# Patient Record
Sex: FEMALE | Race: WHITE | NOT HISPANIC OR LATINO | Employment: OTHER | URBAN - METROPOLITAN AREA
[De-identification: names, ages, dates, MRNs, and addresses within clinical notes are randomized per-mention and may not be internally consistent; named-entity substitution may affect disease eponyms.]

---

## 2023-02-08 PROBLEM — H91.90 HEARING LOSS: Status: ACTIVE | Noted: 2023-02-08

## 2023-02-08 PROBLEM — H61.21 IMPACTED CERUMEN OF RIGHT EAR: Status: ACTIVE | Noted: 2023-02-08

## 2023-02-08 PROBLEM — L43.9 LICHEN PLANUS: Status: ACTIVE | Noted: 2023-02-08

## 2023-02-08 PROBLEM — I10 ESSENTIAL HYPERTENSION: Status: ACTIVE | Noted: 2023-02-08

## 2023-02-08 PROBLEM — E55.9 VITAMIN D DEFICIENCY: Status: ACTIVE | Noted: 2023-02-08

## 2023-02-08 PROBLEM — G62.9 NEUROPATHY: Status: ACTIVE | Noted: 2023-02-08

## 2023-02-08 PROBLEM — R53.1 WEAKNESS: Status: ACTIVE | Noted: 2023-02-08

## 2023-02-08 PROBLEM — F33.9 MAJOR DEPRESSION, RECURRENT (CMS-HCC): Status: ACTIVE | Noted: 2023-02-08

## 2023-02-08 PROBLEM — K75.81 NASH (NONALCOHOLIC STEATOHEPATITIS): Status: ACTIVE | Noted: 2023-02-08

## 2023-02-08 PROBLEM — F90.9 ATTENTION DEFICIT DISORDER WITH HYPERACTIVITY: Status: ACTIVE | Noted: 2023-02-08

## 2023-02-08 PROBLEM — M54.6 LOWER THORACIC BACK PAIN: Status: ACTIVE | Noted: 2023-02-08

## 2023-02-08 PROBLEM — H81.13 BENIGN PAROXYSMAL POSITIONAL VERTIGO DUE TO BILATERAL VESTIBULAR DISORDER: Status: ACTIVE | Noted: 2023-02-08

## 2023-02-08 PROBLEM — K14.8 TONGUE LESION: Status: ACTIVE | Noted: 2023-02-08

## 2023-02-08 PROBLEM — I82.409 DVT (DEEP VENOUS THROMBOSIS) (MULTI): Status: ACTIVE | Noted: 2023-02-08

## 2023-02-08 PROBLEM — E78.5 HYPERLIPIDEMIA: Status: ACTIVE | Noted: 2023-02-08

## 2023-02-08 PROBLEM — M81.0 POSTMENOPAUSAL BONE LOSS: Status: ACTIVE | Noted: 2023-02-08

## 2023-02-08 PROBLEM — B35.4 TINEA CORPORIS: Status: ACTIVE | Noted: 2023-02-08

## 2023-02-08 PROBLEM — R73.01 IMPAIRED FASTING GLUCOSE: Status: ACTIVE | Noted: 2023-02-08

## 2023-02-08 PROBLEM — E03.9 ACQUIRED HYPOTHYROIDISM: Status: ACTIVE | Noted: 2023-02-08

## 2023-02-08 PROBLEM — M54.50 LOW BACK PAIN: Status: ACTIVE | Noted: 2023-02-08

## 2023-02-08 PROBLEM — N95.2 POSTMENOPAUSAL ATROPHIC VAGINITIS: Status: ACTIVE | Noted: 2023-02-08

## 2023-02-08 PROBLEM — D68.62 LUPUS ANTICOAGULANT DISORDER (MULTI): Status: ACTIVE | Noted: 2023-02-08

## 2023-02-08 RX ORDER — TRIAMCINOLONE ACETONIDE 0.25 MG/G
1 CREAM TOPICAL
COMMUNITY
End: 2024-01-10 | Stop reason: WASHOUT

## 2023-02-08 RX ORDER — CLINDAMYCIN PHOSPHATE 11.9 MG/ML
1 SOLUTION TOPICAL 2 TIMES DAILY
COMMUNITY
End: 2023-03-22 | Stop reason: SDUPTHER

## 2023-02-08 RX ORDER — AMLODIPINE BESYLATE 5 MG/1
1 TABLET ORAL DAILY
COMMUNITY
End: 2023-04-13 | Stop reason: SDUPTHER

## 2023-02-08 RX ORDER — DEXTROAMPHETAMINE SACCHARATE, AMPHETAMINE ASPARTATE, DEXTROAMPHETAMINE SULFATE AND AMPHETAMINE SULFATE 2.5; 2.5; 2.5; 2.5 MG/1; MG/1; MG/1; MG/1
1 TABLET ORAL 3 TIMES DAILY
COMMUNITY
End: 2023-10-25 | Stop reason: SDUPTHER

## 2023-02-08 RX ORDER — ATORVASTATIN CALCIUM 40 MG/1
1 TABLET, FILM COATED ORAL DAILY
COMMUNITY
End: 2023-04-13 | Stop reason: SDUPTHER

## 2023-02-08 RX ORDER — METOPROLOL TARTRATE 25 MG/1
1 TABLET, FILM COATED ORAL 2 TIMES DAILY
COMMUNITY
End: 2023-04-13 | Stop reason: SDUPTHER

## 2023-02-08 RX ORDER — WARFARIN 2.5 MG/1
1 TABLET ORAL DAILY
COMMUNITY
End: 2023-04-13 | Stop reason: SDUPTHER

## 2023-02-08 RX ORDER — FLUOXETINE HYDROCHLORIDE 20 MG/1
1 CAPSULE ORAL 2 TIMES DAILY
COMMUNITY
End: 2023-12-07

## 2023-03-22 ENCOUNTER — OFFICE VISIT (OUTPATIENT)
Dept: PRIMARY CARE | Facility: CLINIC | Age: 78
End: 2023-03-22
Payer: MEDICARE

## 2023-03-22 ENCOUNTER — LAB (OUTPATIENT)
Dept: LAB | Facility: LAB | Age: 78
End: 2023-03-22
Payer: MEDICARE

## 2023-03-22 VITALS
WEIGHT: 183 LBS | SYSTOLIC BLOOD PRESSURE: 118 MMHG | DIASTOLIC BLOOD PRESSURE: 68 MMHG | HEIGHT: 64 IN | BODY MASS INDEX: 31.24 KG/M2

## 2023-03-22 DIAGNOSIS — D68.62 LUPUS ANTICOAGULANT DISORDER (MULTI): ICD-10-CM

## 2023-03-22 DIAGNOSIS — I82.519 CHRONIC DEEP VEIN THROMBOSIS (DVT) OF FEMORAL VEIN, UNSPECIFIED LATERALITY (MULTI): ICD-10-CM

## 2023-03-22 DIAGNOSIS — R73.01 IMPAIRED FASTING GLUCOSE: ICD-10-CM

## 2023-03-22 DIAGNOSIS — L71.9 ROSACEA: ICD-10-CM

## 2023-03-22 DIAGNOSIS — F33.9 RECURRENT MAJOR DEPRESSIVE DISORDER, REMISSION STATUS UNSPECIFIED (CMS-HCC): ICD-10-CM

## 2023-03-22 DIAGNOSIS — K75.81 NASH (NONALCOHOLIC STEATOHEPATITIS): ICD-10-CM

## 2023-03-22 DIAGNOSIS — E78.00 PURE HYPERCHOLESTEROLEMIA: ICD-10-CM

## 2023-03-22 DIAGNOSIS — I10 ESSENTIAL HYPERTENSION: Primary | ICD-10-CM

## 2023-03-22 DIAGNOSIS — S69.92XA HAND INJURY, LEFT, INITIAL ENCOUNTER: ICD-10-CM

## 2023-03-22 LAB
ALANINE AMINOTRANSFERASE (SGPT) (U/L) IN SER/PLAS: 18 U/L (ref 7–45)
ALBUMIN (G/DL) IN SER/PLAS: 4.4 G/DL (ref 3.4–5)
ALKALINE PHOSPHATASE (U/L) IN SER/PLAS: 110 U/L (ref 33–136)
ANION GAP IN SER/PLAS: 14 MMOL/L (ref 10–20)
ASPARTATE AMINOTRANSFERASE (SGOT) (U/L) IN SER/PLAS: 25 U/L (ref 9–39)
BASOPHILS (10*3/UL) IN BLOOD BY AUTOMATED COUNT: 0.03 X10E9/L (ref 0–0.1)
BASOPHILS/100 LEUKOCYTES IN BLOOD BY AUTOMATED COUNT: 0.3 % (ref 0–2)
BILIRUBIN TOTAL (MG/DL) IN SER/PLAS: 0.9 MG/DL (ref 0–1.2)
CALCIUM (MG/DL) IN SER/PLAS: 9.6 MG/DL (ref 8.6–10.3)
CARBON DIOXIDE, TOTAL (MMOL/L) IN SER/PLAS: 26 MMOL/L (ref 21–32)
CHLORIDE (MMOL/L) IN SER/PLAS: 103 MMOL/L (ref 98–107)
CHOLESTEROL (MG/DL) IN SER/PLAS: 184 MG/DL (ref 0–199)
CHOLESTEROL IN HDL (MG/DL) IN SER/PLAS: 52.5 MG/DL
CHOLESTEROL/HDL RATIO: 3.5
CREATINE KINASE (U/L) IN SER/PLAS: 44 U/L (ref 0–215)
CREATININE (MG/DL) IN SER/PLAS: 0.98 MG/DL (ref 0.5–1.05)
EOSINOPHILS (10*3/UL) IN BLOOD BY AUTOMATED COUNT: 0.17 X10E9/L (ref 0–0.4)
EOSINOPHILS/100 LEUKOCYTES IN BLOOD BY AUTOMATED COUNT: 1.5 % (ref 0–6)
ERYTHROCYTE DISTRIBUTION WIDTH (RATIO) BY AUTOMATED COUNT: 13.3 % (ref 11.5–14.5)
ERYTHROCYTE MEAN CORPUSCULAR HEMOGLOBIN CONCENTRATION (G/DL) BY AUTOMATED: 33 G/DL (ref 32–36)
ERYTHROCYTE MEAN CORPUSCULAR VOLUME (FL) BY AUTOMATED COUNT: 91 FL (ref 80–100)
ERYTHROCYTES (10*6/UL) IN BLOOD BY AUTOMATED COUNT: 4.96 X10E12/L (ref 4–5.2)
GFR FEMALE: 60 ML/MIN/1.73M2
GLUCOSE (MG/DL) IN SER/PLAS: 84 MG/DL (ref 74–99)
HEMATOCRIT (%) IN BLOOD BY AUTOMATED COUNT: 44.9 % (ref 36–46)
HEMOGLOBIN (G/DL) IN BLOOD: 14.8 G/DL (ref 12–16)
IMMATURE GRANULOCYTES/100 LEUKOCYTES IN BLOOD BY AUTOMATED COUNT: 0.3 % (ref 0–0.9)
LDL: 102 MG/DL (ref 0–99)
LEUKOCYTES (10*3/UL) IN BLOOD BY AUTOMATED COUNT: 11.2 X10E9/L (ref 4.4–11.3)
LYMPHOCYTES (10*3/UL) IN BLOOD BY AUTOMATED COUNT: 2.07 X10E9/L (ref 0.8–3)
LYMPHOCYTES/100 LEUKOCYTES IN BLOOD BY AUTOMATED COUNT: 18.5 % (ref 13–44)
MONOCYTES (10*3/UL) IN BLOOD BY AUTOMATED COUNT: 1.23 X10E9/L (ref 0.05–0.8)
MONOCYTES/100 LEUKOCYTES IN BLOOD BY AUTOMATED COUNT: 11 % (ref 2–10)
NEUTROPHILS (10*3/UL) IN BLOOD BY AUTOMATED COUNT: 7.64 X10E9/L (ref 1.6–5.5)
NEUTROPHILS/100 LEUKOCYTES IN BLOOD BY AUTOMATED COUNT: 68.4 % (ref 40–80)
PLATELETS (10*3/UL) IN BLOOD AUTOMATED COUNT: 347 X10E9/L (ref 150–450)
POTASSIUM (MMOL/L) IN SER/PLAS: 4.4 MMOL/L (ref 3.5–5.3)
PROTEIN TOTAL: 6.8 G/DL (ref 6.4–8.2)
SODIUM (MMOL/L) IN SER/PLAS: 139 MMOL/L (ref 136–145)
THYROTROPIN (MIU/L) IN SER/PLAS BY DETECTION LIMIT <= 0.05 MIU/L: 2.23 MIU/L (ref 0.44–3.98)
TRIGLYCERIDE (MG/DL) IN SER/PLAS: 149 MG/DL (ref 0–149)
UREA NITROGEN (MG/DL) IN SER/PLAS: 22 MG/DL (ref 6–23)
VLDL: 30 MG/DL (ref 0–40)

## 2023-03-22 PROCEDURE — 1159F MED LIST DOCD IN RCRD: CPT | Performed by: INTERNAL MEDICINE

## 2023-03-22 PROCEDURE — 36415 COLL VENOUS BLD VENIPUNCTURE: CPT

## 2023-03-22 PROCEDURE — 80053 COMPREHEN METABOLIC PANEL: CPT

## 2023-03-22 PROCEDURE — 1160F RVW MEDS BY RX/DR IN RCRD: CPT | Performed by: INTERNAL MEDICINE

## 2023-03-22 PROCEDURE — 80061 LIPID PANEL: CPT

## 2023-03-22 PROCEDURE — 82550 ASSAY OF CK (CPK): CPT

## 2023-03-22 PROCEDURE — 3078F DIAST BP <80 MM HG: CPT | Performed by: INTERNAL MEDICINE

## 2023-03-22 PROCEDURE — 84443 ASSAY THYROID STIM HORMONE: CPT

## 2023-03-22 PROCEDURE — 83036 HEMOGLOBIN GLYCOSYLATED A1C: CPT

## 2023-03-22 PROCEDURE — 85025 COMPLETE CBC W/AUTO DIFF WBC: CPT

## 2023-03-22 PROCEDURE — 3074F SYST BP LT 130 MM HG: CPT | Performed by: INTERNAL MEDICINE

## 2023-03-22 PROCEDURE — 99214 OFFICE O/P EST MOD 30 MIN: CPT | Performed by: INTERNAL MEDICINE

## 2023-03-22 PROCEDURE — 1036F TOBACCO NON-USER: CPT | Performed by: INTERNAL MEDICINE

## 2023-03-22 RX ORDER — CLINDAMYCIN PHOSPHATE 11.9 MG/ML
1 SOLUTION TOPICAL 2 TIMES DAILY
Qty: 60 ML | Refills: 0 | Status: SHIPPED | OUTPATIENT
Start: 2023-03-22 | End: 2024-01-10 | Stop reason: WASHOUT

## 2023-03-22 ASSESSMENT — PATIENT HEALTH QUESTIONNAIRE - PHQ9
2. FEELING DOWN, DEPRESSED OR HOPELESS: NOT AT ALL
SUM OF ALL RESPONSES TO PHQ9 QUESTIONS 1 AND 2: 0
1. LITTLE INTEREST OR PLEASURE IN DOING THINGS: NOT AT ALL

## 2023-03-22 NOTE — PROGRESS NOTES
Subjective   Patient ID: Diane Rao is a 77 y.o. female who presents for medication refills.  She is doing well  She found a magazine online, [employer], and has been helpful  It has helped her immensely    Her social anxiety makes her overly sensitive and would make her sick/anxious for 2 days prior to a family get together    No cp or pressure  She gets a little winded with exertion  Has not been exercising, because of the weather  Was having trouble with her ankle but ankle is getting better  Her bowels get loose at times  Has trouble getting to sleep  She has a runny nose    Her stool went out from under her when she was leaning cleaning out her lower cupboards and stool rolled out from under her and hit her left hand, ever since then at night time, or when it is going to rain, hand will get sore, not sure if it bruised, this happened in the last 6 months.           Review of Systems    Objective     Visit Vitals  /68        Physical Exam  Constitutional:       Appearance: Normal appearance.   Neck:      Vascular: No carotid bruit.   Cardiovascular:      Rate and Rhythm: Normal rate and regular rhythm.      Heart sounds: No murmur heard.  Pulmonary:      Effort: Pulmonary effort is normal.      Breath sounds: Normal breath sounds. No wheezing, rhonchi or rales.   Lymphadenopathy:      Cervical: No cervical adenopathy.   Skin:     Coloration: Skin is not jaundiced or pale.   Neurological:      Mental Status: She is alert.         Assessment/Plan     Problem List Items Addressed This Visit    None            There are no Patient Instructions on file for this visit.     For all testing, if you have not received results within 5 business days, please call the office.    All results will be available as well on [employer]. Make sure you have signed up.  Patient was identified as a fall risk. Risk prevention instructions provided.

## 2023-03-22 NOTE — PATIENT INSTRUCTIONS
Bp is well controlled, continue amlodipine and metoprolol    High cholesterol, on atorvastatin, get fasting labs    Dvt with lupus anticoagulant as cause, on lifetime coumadin, check INR monthly    Subclinical hypothyroid, checking tsh    For itchiness and insomnia, trial of benadryl 25mg at bedtime, if not of benefit discuss with psychiatry possibility of trazodone    Recheck in 6months    Get hand xray due to injury and pain    All immunizations, mammogram, bone density and colon screen up to date      Ways to Help Prevent Falls at Home    Quick Tips   ? Ask for help if you need it. Most people want to help!   ? Get up slowly after sitting or laying down   ? Wear a medical alert device or keep cell phone in your pocket   ? Use night lights, especially areas near a bathroom   ? Keep the items you use often within reach on a small stool or end table   ? Use an assistive device such as walker or cane, as directed by provider/physical therapy   ? Use a non-slip mat and grab bars in your bathroom. Look for home health sections for best options     Other Areas to Focus On   ? Exercise and nutrition: Regular exercise or taking a falls prevention class are great ways improve strength and balance. Don’t forget to stay hydrated and bring a snack!   ? Medicine side effects: Some medicines can make you sleepy or dizzy, which could cause a fall. Ask your healthcare provider about the side effects your medicines could cause. Be sure to let them know if you take any vitamins or supplements as well.   ? Tripping hazards: Remove items you could trip on, such as loose mats, rugs, cords, and clutter. Wear closed toe shoes with rubber soles.   ? Health and wellness: Get regular checkups with your healthcare provider, plus routine vision and hearing screenings. Talk with your healthcare provider about:   o Your medicines and the possible side effects - bring them in a bag if that is easier!   o Problems with balance or feeling dizzy    o Ways to promote bone health, such as Vitamin D and calcium supplements   o Questions or concerns about falling     *Ask your healthcare team if you have questions     ©[facility], 2022

## 2023-03-23 LAB
ESTIMATED AVERAGE GLUCOSE FOR HBA1C: 123 MG/DL
HEMOGLOBIN A1C/HEMOGLOBIN TOTAL IN BLOOD: 5.9 %

## 2023-04-13 DIAGNOSIS — E03.9 ACQUIRED HYPOTHYROIDISM: Primary | ICD-10-CM

## 2023-04-13 DIAGNOSIS — I10 ESSENTIAL HYPERTENSION: ICD-10-CM

## 2023-04-13 DIAGNOSIS — I82.519 CHRONIC DEEP VEIN THROMBOSIS (DVT) OF FEMORAL VEIN, UNSPECIFIED LATERALITY (MULTI): ICD-10-CM

## 2023-04-13 DIAGNOSIS — E78.00 PURE HYPERCHOLESTEROLEMIA: ICD-10-CM

## 2023-04-18 RX ORDER — LEVOTHYROXINE SODIUM 50 UG/1
50 TABLET ORAL DAILY
Qty: 90 TABLET | Refills: 3 | Status: SHIPPED | OUTPATIENT
Start: 2023-04-18 | End: 2023-05-30 | Stop reason: SDUPTHER

## 2023-04-18 RX ORDER — METOPROLOL TARTRATE 25 MG/1
25 TABLET, FILM COATED ORAL 2 TIMES DAILY
Qty: 90 TABLET | Refills: 0 | Status: SHIPPED | OUTPATIENT
Start: 2023-04-18 | End: 2023-05-30

## 2023-04-18 RX ORDER — WARFARIN 2.5 MG/1
2.5 TABLET ORAL NIGHTLY
Qty: 90 TABLET | Refills: 0 | Status: SHIPPED | OUTPATIENT
Start: 2023-04-18 | End: 2023-05-30 | Stop reason: SDUPTHER

## 2023-04-18 RX ORDER — ATORVASTATIN CALCIUM 40 MG/1
40 TABLET, FILM COATED ORAL DAILY
Qty: 90 TABLET | Refills: 0 | Status: SHIPPED | OUTPATIENT
Start: 2023-04-18 | End: 2023-05-30 | Stop reason: SDUPTHER

## 2023-04-18 RX ORDER — AMLODIPINE BESYLATE 5 MG/1
5 TABLET ORAL DAILY
Qty: 90 TABLET | Refills: 0 | Status: SHIPPED | OUTPATIENT
Start: 2023-04-18 | End: 2023-05-30 | Stop reason: SDUPTHER

## 2023-04-18 NOTE — TELEPHONE ENCOUNTER
Requested Prescriptions     Pending Prescriptions Disp Refills    amLODIPine (Norvasc) 5 mg tablet 90 tablet 0     Sig: Take 1 tablet (5 mg) by mouth once daily.    atorvastatin (Lipitor) 40 mg tablet 90 tablet 0     Sig: Take 1 tablet (40 mg) by mouth once daily.    metoprolol tartrate (Lopressor) 25 mg tablet 90 tablet 0     Sig: Take 1 tablet (25 mg) by mouth in the morning and 1 tablet (25 mg) before bedtime.    warfarin (Coumadin) 2.5 mg tablet 90 tablet 0     Sig: Take 1 tablet (2.5 mg) by mouth once daily at bedtime. Except none on friday

## 2023-05-27 DIAGNOSIS — I10 ESSENTIAL HYPERTENSION: ICD-10-CM

## 2023-05-27 DIAGNOSIS — I82.519 CHRONIC DEEP VEIN THROMBOSIS (DVT) OF FEMORAL VEIN, UNSPECIFIED LATERALITY (MULTI): ICD-10-CM

## 2023-05-27 DIAGNOSIS — E78.00 PURE HYPERCHOLESTEROLEMIA: ICD-10-CM

## 2023-05-30 DIAGNOSIS — I10 ESSENTIAL HYPERTENSION: ICD-10-CM

## 2023-05-30 DIAGNOSIS — E03.9 ACQUIRED HYPOTHYROIDISM: ICD-10-CM

## 2023-05-30 DIAGNOSIS — E78.00 PURE HYPERCHOLESTEROLEMIA: ICD-10-CM

## 2023-05-30 DIAGNOSIS — I82.519 CHRONIC DEEP VEIN THROMBOSIS (DVT) OF FEMORAL VEIN, UNSPECIFIED LATERALITY (MULTI): ICD-10-CM

## 2023-05-30 RX ORDER — METOPROLOL TARTRATE 25 MG/1
TABLET, FILM COATED ORAL
Qty: 90 TABLET | Refills: 1 | Status: SHIPPED | OUTPATIENT
Start: 2023-05-30 | End: 2023-05-30 | Stop reason: SDUPTHER

## 2023-05-30 NOTE — TELEPHONE ENCOUNTER
Requested Prescriptions     Pending Prescriptions Disp Refills    metoprolol tartrate (Lopressor) 25 mg tablet 90 tablet 1     Sig: Take 1 tablet (25 mg) by mouth 2 times a day.    levothyroxine (Synthroid, Levoxyl) 50 mcg tablet 90 tablet 3     Sig: Take 1 tablet (50 mcg) by mouth once daily.    warfarin (Coumadin) 2.5 mg tablet 90 tablet 0     Sig: Take 1 tablet (2.5 mg) by mouth once daily at bedtime. Except none on friday    atorvastatin (Lipitor) 40 mg tablet 90 tablet 0     Sig: Take 1 tablet (40 mg) by mouth once daily.    amLODIPine (Norvasc) 5 mg tablet 90 tablet 0     Sig: Take 1 tablet (5 mg) by mouth once daily.     Mail order

## 2023-05-31 RX ORDER — METOPROLOL TARTRATE 25 MG/1
25 TABLET, FILM COATED ORAL 2 TIMES DAILY
Qty: 90 TABLET | Refills: 1 | Status: SHIPPED | OUTPATIENT
Start: 2023-05-31 | End: 2023-08-29 | Stop reason: SDUPTHER

## 2023-05-31 RX ORDER — LEVOTHYROXINE SODIUM 50 UG/1
50 TABLET ORAL DAILY
Qty: 90 TABLET | Refills: 3 | Status: SHIPPED | OUTPATIENT
Start: 2023-05-31 | End: 2024-06-03

## 2023-05-31 RX ORDER — ATORVASTATIN CALCIUM 40 MG/1
40 TABLET, FILM COATED ORAL DAILY
Qty: 90 TABLET | Refills: 0 | Status: SHIPPED | OUTPATIENT
Start: 2023-05-31 | End: 2023-08-16

## 2023-05-31 RX ORDER — AMLODIPINE BESYLATE 5 MG/1
5 TABLET ORAL DAILY
Qty: 90 TABLET | Refills: 0 | Status: SHIPPED | OUTPATIENT
Start: 2023-05-31 | End: 2023-08-16

## 2023-05-31 RX ORDER — WARFARIN 2.5 MG/1
2.5 TABLET ORAL NIGHTLY
Qty: 90 TABLET | Refills: 0 | Status: SHIPPED | OUTPATIENT
Start: 2023-05-31 | End: 2023-08-16

## 2023-08-16 DIAGNOSIS — E78.00 PURE HYPERCHOLESTEROLEMIA: ICD-10-CM

## 2023-08-16 DIAGNOSIS — I82.519 CHRONIC DEEP VEIN THROMBOSIS (DVT) OF FEMORAL VEIN, UNSPECIFIED LATERALITY (MULTI): ICD-10-CM

## 2023-08-16 DIAGNOSIS — I10 ESSENTIAL HYPERTENSION: ICD-10-CM

## 2023-08-16 RX ORDER — WARFARIN SODIUM 2.5 MG/1
TABLET ORAL
Qty: 78 TABLET | Refills: 3 | Status: SHIPPED | OUTPATIENT
Start: 2023-08-16

## 2023-08-16 RX ORDER — ATORVASTATIN CALCIUM 40 MG/1
40 TABLET, FILM COATED ORAL DAILY
Qty: 90 TABLET | Refills: 1 | Status: SHIPPED | OUTPATIENT
Start: 2023-08-16 | End: 2024-03-18

## 2023-08-16 RX ORDER — AMLODIPINE BESYLATE 5 MG/1
5 TABLET ORAL DAILY
Qty: 90 TABLET | Refills: 1 | Status: SHIPPED | OUTPATIENT
Start: 2023-08-16 | End: 2024-03-18

## 2023-08-29 DIAGNOSIS — E78.00 PURE HYPERCHOLESTEROLEMIA: ICD-10-CM

## 2023-08-29 DIAGNOSIS — R73.01 IMPAIRED FASTING GLUCOSE: ICD-10-CM

## 2023-08-29 DIAGNOSIS — I10 ESSENTIAL HYPERTENSION: ICD-10-CM

## 2023-08-29 DIAGNOSIS — D68.62 LUPUS ANTICOAGULANT DISORDER (MULTI): ICD-10-CM

## 2023-08-29 DIAGNOSIS — I82.519 CHRONIC DEEP VEIN THROMBOSIS (DVT) OF FEMORAL VEIN, UNSPECIFIED LATERALITY (MULTI): ICD-10-CM

## 2023-08-29 RX ORDER — METOPROLOL TARTRATE 25 MG/1
25 TABLET, FILM COATED ORAL 2 TIMES DAILY
Qty: 180 TABLET | Refills: 3 | Status: SHIPPED | OUTPATIENT
Start: 2023-08-29 | End: 2024-03-04

## 2023-08-29 NOTE — TELEPHONE ENCOUNTER
Requested Prescriptions     Pending Prescriptions Disp Refills    metoprolol tartrate (Lopressor) 25 mg tablet 180 tablet 1     Sig: Take 1 tablet (25 mg) by mouth 2 times a day.

## 2023-09-20 ENCOUNTER — LAB (OUTPATIENT)
Dept: LAB | Facility: LAB | Age: 78
End: 2023-09-20
Payer: MEDICARE

## 2023-09-20 DIAGNOSIS — E78.00 PURE HYPERCHOLESTEROLEMIA: ICD-10-CM

## 2023-09-20 DIAGNOSIS — I10 ESSENTIAL HYPERTENSION: ICD-10-CM

## 2023-09-20 DIAGNOSIS — D68.62 LUPUS ANTICOAGULANT DISORDER (MULTI): ICD-10-CM

## 2023-09-20 DIAGNOSIS — R73.01 IMPAIRED FASTING GLUCOSE: ICD-10-CM

## 2023-09-20 LAB
ALANINE AMINOTRANSFERASE (SGPT) (U/L) IN SER/PLAS: 23 U/L (ref 7–45)
ALBUMIN (G/DL) IN SER/PLAS: 4 G/DL (ref 3.4–5)
ALKALINE PHOSPHATASE (U/L) IN SER/PLAS: 124 U/L (ref 33–136)
ANION GAP IN SER/PLAS: 13 MMOL/L (ref 10–20)
ASPARTATE AMINOTRANSFERASE (SGOT) (U/L) IN SER/PLAS: 25 U/L (ref 9–39)
BASOPHILS (10*3/UL) IN BLOOD BY AUTOMATED COUNT: 0.04 X10E9/L (ref 0–0.1)
BASOPHILS/100 LEUKOCYTES IN BLOOD BY AUTOMATED COUNT: 0.3 % (ref 0–2)
BILIRUBIN TOTAL (MG/DL) IN SER/PLAS: 0.7 MG/DL (ref 0–1.2)
CALCIUM (MG/DL) IN SER/PLAS: 9.3 MG/DL (ref 8.6–10.3)
CARBON DIOXIDE, TOTAL (MMOL/L) IN SER/PLAS: 28 MMOL/L (ref 21–32)
CHLORIDE (MMOL/L) IN SER/PLAS: 102 MMOL/L (ref 98–107)
CHOLESTEROL (MG/DL) IN SER/PLAS: 171 MG/DL (ref 0–199)
CHOLESTEROL IN HDL (MG/DL) IN SER/PLAS: 49.8 MG/DL
CHOLESTEROL/HDL RATIO: 3.4
CREATINE KINASE (U/L) IN SER/PLAS: 35 U/L (ref 0–215)
CREATININE (MG/DL) IN SER/PLAS: 0.93 MG/DL (ref 0.5–1.05)
EOSINOPHILS (10*3/UL) IN BLOOD BY AUTOMATED COUNT: 0.32 X10E9/L (ref 0–0.4)
EOSINOPHILS/100 LEUKOCYTES IN BLOOD BY AUTOMATED COUNT: 2.7 % (ref 0–6)
ERYTHROCYTE DISTRIBUTION WIDTH (RATIO) BY AUTOMATED COUNT: 13.4 % (ref 11.5–14.5)
ERYTHROCYTE MEAN CORPUSCULAR HEMOGLOBIN CONCENTRATION (G/DL) BY AUTOMATED: 31.4 G/DL (ref 32–36)
ERYTHROCYTE MEAN CORPUSCULAR VOLUME (FL) BY AUTOMATED COUNT: 91 FL (ref 80–100)
ERYTHROCYTES (10*6/UL) IN BLOOD BY AUTOMATED COUNT: 5.03 X10E12/L (ref 4–5.2)
GFR FEMALE: 64 ML/MIN/1.73M2
GLUCOSE (MG/DL) IN SER/PLAS: 77 MG/DL (ref 74–99)
HEMATOCRIT (%) IN BLOOD BY AUTOMATED COUNT: 45.6 % (ref 36–46)
HEMOGLOBIN (G/DL) IN BLOOD: 14.3 G/DL (ref 12–16)
IMMATURE GRANULOCYTES/100 LEUKOCYTES IN BLOOD BY AUTOMATED COUNT: 0.7 % (ref 0–0.9)
LDL: 92 MG/DL (ref 0–99)
LEUKOCYTES (10*3/UL) IN BLOOD BY AUTOMATED COUNT: 12 X10E9/L (ref 4.4–11.3)
LYMPHOCYTES (10*3/UL) IN BLOOD BY AUTOMATED COUNT: 2.12 X10E9/L (ref 0.8–3)
LYMPHOCYTES/100 LEUKOCYTES IN BLOOD BY AUTOMATED COUNT: 17.6 % (ref 13–44)
MONOCYTES (10*3/UL) IN BLOOD BY AUTOMATED COUNT: 1.53 X10E9/L (ref 0.05–0.8)
MONOCYTES/100 LEUKOCYTES IN BLOOD BY AUTOMATED COUNT: 12.7 % (ref 2–10)
NEUTROPHILS (10*3/UL) IN BLOOD BY AUTOMATED COUNT: 7.93 X10E9/L (ref 1.6–5.5)
NEUTROPHILS/100 LEUKOCYTES IN BLOOD BY AUTOMATED COUNT: 66 % (ref 40–80)
PLATELETS (10*3/UL) IN BLOOD AUTOMATED COUNT: 325 X10E9/L (ref 150–450)
POTASSIUM (MMOL/L) IN SER/PLAS: 4.4 MMOL/L (ref 3.5–5.3)
PROTEIN TOTAL: 6.6 G/DL (ref 6.4–8.2)
SODIUM (MMOL/L) IN SER/PLAS: 139 MMOL/L (ref 136–145)
THYROTROPIN (MIU/L) IN SER/PLAS BY DETECTION LIMIT <= 0.05 MIU/L: 2.69 MIU/L (ref 0.44–3.98)
TRIGLYCERIDE (MG/DL) IN SER/PLAS: 148 MG/DL (ref 0–149)
UREA NITROGEN (MG/DL) IN SER/PLAS: 23 MG/DL (ref 6–23)
VLDL: 30 MG/DL (ref 0–40)

## 2023-09-20 PROCEDURE — 84443 ASSAY THYROID STIM HORMONE: CPT

## 2023-09-20 PROCEDURE — 83036 HEMOGLOBIN GLYCOSYLATED A1C: CPT

## 2023-09-20 PROCEDURE — 36415 COLL VENOUS BLD VENIPUNCTURE: CPT

## 2023-09-20 PROCEDURE — 80053 COMPREHEN METABOLIC PANEL: CPT

## 2023-09-20 PROCEDURE — 82550 ASSAY OF CK (CPK): CPT

## 2023-09-20 PROCEDURE — 80061 LIPID PANEL: CPT

## 2023-09-20 PROCEDURE — 85025 COMPLETE CBC W/AUTO DIFF WBC: CPT

## 2023-09-21 LAB
ESTIMATED AVERAGE GLUCOSE FOR HBA1C: 117 MG/DL
HEMOGLOBIN A1C/HEMOGLOBIN TOTAL IN BLOOD: 5.7 %

## 2023-09-22 ENCOUNTER — OFFICE VISIT (OUTPATIENT)
Dept: PRIMARY CARE | Facility: CLINIC | Age: 78
End: 2023-09-22
Payer: MEDICARE

## 2023-09-22 VITALS
HEIGHT: 64 IN | BODY MASS INDEX: 30.56 KG/M2 | DIASTOLIC BLOOD PRESSURE: 82 MMHG | WEIGHT: 179 LBS | SYSTOLIC BLOOD PRESSURE: 148 MMHG | HEART RATE: 69 BPM

## 2023-09-22 DIAGNOSIS — R73.01 IMPAIRED FASTING GLUCOSE: ICD-10-CM

## 2023-09-22 DIAGNOSIS — H91.90 HEARING LOSS, UNSPECIFIED HEARING LOSS TYPE, UNSPECIFIED LATERALITY: ICD-10-CM

## 2023-09-22 DIAGNOSIS — Z00.00 ROUTINE GENERAL MEDICAL EXAMINATION AT HEALTH CARE FACILITY: Primary | ICD-10-CM

## 2023-09-22 DIAGNOSIS — E03.9 ACQUIRED HYPOTHYROIDISM: ICD-10-CM

## 2023-09-22 DIAGNOSIS — E55.9 VITAMIN D DEFICIENCY: ICD-10-CM

## 2023-09-22 DIAGNOSIS — E73.9 LACTOSE INTOLERANCE: ICD-10-CM

## 2023-09-22 DIAGNOSIS — Z12.31 VISIT FOR SCREENING MAMMOGRAM: ICD-10-CM

## 2023-09-22 DIAGNOSIS — E78.00 PURE HYPERCHOLESTEROLEMIA: ICD-10-CM

## 2023-09-22 DIAGNOSIS — M54.42 CHRONIC LEFT-SIDED LOW BACK PAIN WITH LEFT-SIDED SCIATICA: ICD-10-CM

## 2023-09-22 DIAGNOSIS — G89.29 CHRONIC LEFT-SIDED LOW BACK PAIN WITH LEFT-SIDED SCIATICA: ICD-10-CM

## 2023-09-22 DIAGNOSIS — I10 ESSENTIAL HYPERTENSION: ICD-10-CM

## 2023-09-22 PROCEDURE — 1036F TOBACCO NON-USER: CPT | Performed by: INTERNAL MEDICINE

## 2023-09-22 PROCEDURE — 1160F RVW MEDS BY RX/DR IN RCRD: CPT | Performed by: INTERNAL MEDICINE

## 2023-09-22 PROCEDURE — G0439 PPPS, SUBSEQ VISIT: HCPCS | Performed by: INTERNAL MEDICINE

## 2023-09-22 PROCEDURE — 3077F SYST BP >= 140 MM HG: CPT | Performed by: INTERNAL MEDICINE

## 2023-09-22 PROCEDURE — 99214 OFFICE O/P EST MOD 30 MIN: CPT | Performed by: INTERNAL MEDICINE

## 2023-09-22 PROCEDURE — 1170F FXNL STATUS ASSESSED: CPT | Performed by: INTERNAL MEDICINE

## 2023-09-22 PROCEDURE — 1159F MED LIST DOCD IN RCRD: CPT | Performed by: INTERNAL MEDICINE

## 2023-09-22 PROCEDURE — 3079F DIAST BP 80-89 MM HG: CPT | Performed by: INTERNAL MEDICINE

## 2023-09-22 ASSESSMENT — ACTIVITIES OF DAILY LIVING (ADL)
MANAGING_FINANCES: INDEPENDENT
BATHING: INDEPENDENT
TAKING_MEDICATION: INDEPENDENT
DOING_HOUSEWORK: INDEPENDENT
DRESSING: INDEPENDENT
GROCERY_SHOPPING: INDEPENDENT

## 2023-09-22 NOTE — PROGRESS NOTES
Subjective   Patient ID: Diane Rao is a 77 y.o. female who presents for Medicare Annual Wellness Visit Subsequent.    HPI     Review of Systems    Objective   There were no vitals taken for this visit.    Physical Exam    Assessment/Plan

## 2023-09-22 NOTE — PATIENT INSTRUCTIONS
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 Great job with diet and exercise    Depression and ADD per psychiatry    Recheck in 6months

## 2023-09-22 NOTE — PROGRESS NOTES
"Subjective   Reason for Visit: Diane Rao is an 77 y.o. female here for a Medicare Wellness visit.     Past Medical, Surgical, and Family History reviewed and updated in chart.    Reviewed all medications by prescribing practitioner or clinical pharmacist (such as prescriptions, OTCs, herbal therapies and supplements) and documented in the medical record.    Her  has been doing a lot of trips  She enjoys staying home and feels it is related to her ADD    She has had dentures since 31 y/o she has had bone receding over all those years, she gets some pain in the lower jaw    Doesn't hurt to chew  Her dizziness never came back once, she went to vestibular therapy.     No cp or pressure  She gets sob when walking, if she goes too fast, goes 3 times a week  She went to PT in 2020, for lower back pain  She cannot stand very long, and when she does she gets numbness in her left foot  When she walks her dog she gets pain in her thigh/butt and hip on the left side    Her dizziness is gone,   She is lactose intolerant        Patient Care Team:  Keyona Cam DO as PCP - General  Keyona Cam DO as PCP - [facility] Attributed Provider     Review of Systems    Objective   Vitals:  /82   Pulse 69   Ht 1.626 m (5' 4\")   Wt 81.2 kg (179 lb)   BMI 30.73 kg/m²       Physical Exam  Constitutional:       Appearance: Normal appearance.   Neck:      Vascular: No carotid bruit.   Cardiovascular:      Rate and Rhythm: Normal rate.      Heart sounds: No murmur heard.  Pulmonary:      Effort: Pulmonary effort is normal.      Breath sounds: Normal breath sounds.   Musculoskeletal:      Right lower leg: No edema.      Left lower leg: No edema.   Neurological:      Mental Status: She is oriented to person, place, and time.   Psychiatric:         Mood and Affect: Mood normal.         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 Great job with diet and exercise    Depression and ADD per psychiatry    Recheck in 6months

## 2023-10-09 ENCOUNTER — HOSPITAL ENCOUNTER (OUTPATIENT)
Dept: RADIOLOGY | Facility: HOSPITAL | Age: 78
Discharge: HOME | End: 2023-10-09
Payer: MEDICARE

## 2023-10-09 DIAGNOSIS — Z12.31 VISIT FOR SCREENING MAMMOGRAM: ICD-10-CM

## 2023-10-09 PROCEDURE — 77063 BREAST TOMOSYNTHESIS BI: CPT | Mod: 50

## 2023-10-09 PROCEDURE — 77063 BREAST TOMOSYNTHESIS BI: CPT | Mod: BILATERAL PROCEDURE | Performed by: STUDENT IN AN ORGANIZED HEALTH CARE EDUCATION/TRAINING PROGRAM

## 2023-10-09 PROCEDURE — 77067 SCR MAMMO BI INCL CAD: CPT | Mod: BILATERAL PROCEDURE | Performed by: STUDENT IN AN ORGANIZED HEALTH CARE EDUCATION/TRAINING PROGRAM

## 2023-10-21 PROBLEM — D48.5 NEOPLASM OF UNCERTAIN BEHAVIOR OF SKIN: Status: ACTIVE | Noted: 2022-03-15

## 2023-10-21 PROBLEM — H81.11 BENIGN PAROXYSMAL POSITIONAL VERTIGO OF RIGHT EAR: Status: ACTIVE | Noted: 2023-10-21

## 2023-10-21 PROBLEM — F98.8 ATTENTION DEFICIT DISORDER WITHOUT HYPERACTIVITY: Status: ACTIVE | Noted: 2023-10-21

## 2023-10-21 RX ORDER — DEXTROAMPHETAMINE SACCHARATE, AMPHETAMINE ASPARTATE, DEXTROAMPHETAMINE SULFATE AND AMPHETAMINE SULFATE 2.5; 2.5; 2.5; 2.5 MG/1; MG/1; MG/1; MG/1
TABLET ORAL
COMMUNITY
End: 2023-10-25 | Stop reason: SDUPTHER

## 2023-10-21 RX ORDER — ACETAMINOPHEN 500 MG
1 TABLET ORAL DAILY
COMMUNITY

## 2023-10-21 RX ORDER — WARFARIN 2.5 MG/1
1 TABLET ORAL DAILY
COMMUNITY
End: 2023-12-07

## 2023-10-21 RX ORDER — BUPROPION HYDROCHLORIDE 150 MG/1
150 TABLET, EXTENDED RELEASE ORAL 2 TIMES DAILY
COMMUNITY
Start: 2011-05-04 | End: 2024-01-10 | Stop reason: WASHOUT

## 2023-10-21 RX ORDER — ATORVASTATIN CALCIUM 40 MG/1
1 TABLET, FILM COATED ORAL DAILY
COMMUNITY
Start: 2017-08-10 | End: 2023-12-07

## 2023-10-21 RX ORDER — ATORVASTATIN CALCIUM 10 MG/1
TABLET, FILM COATED ORAL
COMMUNITY
End: 2023-12-07

## 2023-10-21 RX ORDER — ESTRADIOL 0.1 MG/G
CREAM VAGINAL
COMMUNITY
Start: 2018-07-26 | End: 2024-06-03

## 2023-10-21 RX ORDER — AMLODIPINE BESYLATE 5 MG/1
1 TABLET ORAL DAILY
COMMUNITY
Start: 2012-05-09 | End: 2023-12-07

## 2023-10-21 RX ORDER — ACETAMINOPHEN 500 MG
1 TABLET ORAL DAILY
COMMUNITY
End: 2024-01-10 | Stop reason: WASHOUT

## 2023-10-21 RX ORDER — FLUOXETINE 20 MG/1
TABLET ORAL 2 TIMES DAILY
COMMUNITY
End: 2024-01-10 | Stop reason: SDUPTHER

## 2023-10-21 RX ORDER — LEVOTHYROXINE SODIUM 50 UG/1
1 TABLET ORAL DAILY
COMMUNITY
Start: 2020-09-18 | End: 2023-12-07

## 2023-10-21 RX ORDER — DEXTROAMPHETAMINE SACCHARATE, AMPHETAMINE ASPARTATE, DEXTROAMPHETAMINE SULFATE AND AMPHETAMINE SULFATE 5; 5; 5; 5 MG/1; MG/1; MG/1; MG/1
0.5 TABLET ORAL 3 TIMES DAILY
COMMUNITY
Start: 2023-03-14 | End: 2023-10-25 | Stop reason: SDUPTHER

## 2023-10-21 RX ORDER — METOPROLOL TARTRATE 25 MG/1
1 TABLET, FILM COATED ORAL 2 TIMES DAILY
COMMUNITY
Start: 2017-08-10 | End: 2023-12-07

## 2023-10-21 RX ORDER — PREGABALIN 50 MG/1
CAPSULE ORAL
COMMUNITY
End: 2024-01-10 | Stop reason: WASHOUT

## 2023-10-25 ENCOUNTER — TELEMEDICINE (OUTPATIENT)
Dept: BEHAVIORAL HEALTH | Facility: CLINIC | Age: 78
End: 2023-10-25
Payer: MEDICARE

## 2023-10-25 DIAGNOSIS — F33.41 RECURRENT MAJOR DEPRESSIVE DISORDER, IN PARTIAL REMISSION (CMS-HCC): ICD-10-CM

## 2023-10-25 DIAGNOSIS — F90.9 ATTENTION DEFICIT DISORDER WITH HYPERACTIVITY: ICD-10-CM

## 2023-10-25 DIAGNOSIS — F41.1 GAD (GENERALIZED ANXIETY DISORDER): ICD-10-CM

## 2023-10-25 PROCEDURE — 99214 OFFICE O/P EST MOD 30 MIN: CPT | Performed by: PSYCHIATRY & NEUROLOGY

## 2023-10-25 RX ORDER — DEXTROAMPHETAMINE SACCHARATE, AMPHETAMINE ASPARTATE, DEXTROAMPHETAMINE SULFATE AND AMPHETAMINE SULFATE 2.5; 2.5; 2.5; 2.5 MG/1; MG/1; MG/1; MG/1
10 TABLET ORAL 3 TIMES DAILY
Qty: 270 TABLET | Refills: 0 | Status: SHIPPED | OUTPATIENT
Start: 2023-10-25 | End: 2024-01-10 | Stop reason: SDUPTHER

## 2023-10-25 RX ORDER — LORAZEPAM 0.5 MG/1
0.5 TABLET ORAL EVERY 8 HOURS PRN
Qty: 270 TABLET | Refills: 0 | Status: SHIPPED | OUTPATIENT
Start: 2023-10-25 | End: 2024-04-03 | Stop reason: SDUPTHER

## 2023-10-25 NOTE — PROGRESS NOTES
Outpatient Psychiatry - Med Management Followup     Location of service:  ___ Office ___ A/V _X__Telephone (3 factor ID completed)      Subjective   Diane Rao, a 78 y.o. female, for followup visit.      Assessment/Plan   Diagnoses that are Current Focus of Attention:  Problem List Items Addressed This Visit             ICD-10-CM    Attention deficit disorder with hyperactivity F90.9    Relevant Medications    amphetamine-dextroamphetamine (Adderall) 10 mg tablet    Major depression, recurrent (CMS/HCC) F33.9     Other Visit Diagnoses         Codes    GLEN (generalized anxiety disorder)     F41.1    Relevant Medications    LORazepam (Ativan) 0.5 mg tablet            Treatment Plan/Recommendations:    Continue current medications and support    Follow-up plan was discussed with patient.    Referral to:  NA    Review with patient: Treatment plan reviewed with the patient.  Medication risks/benefit reviewed with the patient    HPI:   Patient reports doing well since last visit, no complaints or concerns.  Will need new prscriptions for Adderall and Lorazepam, has refills on Fluoxetine.    Current Medications:    Current Outpatient Medications:     amLODIPine (Norvasc) 5 mg tablet, TAKE 1 TABLET ONCE DAILY, Disp: 90 tablet, Rfl: 1    amLODIPine (Norvasc) 5 mg tablet, Take 1 tablet (5 mg) by mouth once daily., Disp: , Rfl:     atorvastatin (Lipitor) 10 mg tablet, Take by mouth., Disp: , Rfl:     atorvastatin (Lipitor) 40 mg tablet, TAKE 1 TABLET ONCE DAILY, Disp: 90 tablet, Rfl: 1    atorvastatin (Lipitor) 40 mg tablet, Take 1 tablet (40 mg) by mouth once daily., Disp: , Rfl:     cholecalciferol (Vitamin D-3) 5,000 Units tablet, Take 1 tablet (5,000 Units) by mouth once daily., Disp: , Rfl:     cholecalciferol (Vitamin D-3) 50 mcg (2,000 unit) capsule, Take 1 capsule (2,000 Units) by mouth once daily., Disp: , Rfl:     cholecalciferol, vitamin D3, (VITAMIN D3 ORAL), Take 1 capsule by mouth 1 (one) time each day.,  Disp: , Rfl:     clindamycin (Cleocin T) 1 % external solution, Apply 1 Application topically in the morning and 1 Application before bedtime. To affected areas for underarms for 2-4 weeks as needed for flares/order., Disp: 60 mL, Rfl: 0    estradiol (Estrace) 0.01 % (0.1 mg/gram) vaginal cream, Insert into the vagina., Disp: , Rfl:     FLUoxetine (PROzac) 20 mg capsule, Take 1 capsule (20 mg) by mouth 2 times a day., Disp: , Rfl:     FLUoxetine (PROzac) 20 mg tablet, Take by mouth twice a day., Disp: , Rfl:     levothyroxine (Synthroid, Levoxyl) 50 mcg tablet, Take 1 tablet (50 mcg) by mouth once daily., Disp: 90 tablet, Rfl: 3    levothyroxine (Synthroid, Levoxyl) 50 mcg tablet, Take 1 tablet (50 mcg) by mouth once daily., Disp: , Rfl:     metoprolol tartrate (Lopressor) 25 mg tablet, Take 1 tablet (25 mg) by mouth 2 times a day., Disp: 180 tablet, Rfl: 3    metoprolol tartrate (Lopressor) 25 mg tablet, Take 1 tablet (25 mg) by mouth 2 times a day., Disp: , Rfl:     triamcinolone (Kenalog) 0.025 % cream, Apply 1 Application topically. Apply sparingly to affected area(s) 2 to 3 times dally, Disp: , Rfl:     warfarin (Coumadin) 2.5 mg tablet, Take 1 tablet (2.5 mg) by mouth once daily., Disp: , Rfl:     warfarin (Jantoven) 2.5 mg tablet, TAKE 1 TABLET (2.5MG)ONCE  DAILY AT BEDTIME EXCEPT    NONE ON FRIDAY., Disp: 78 tablet, Rfl: 3    amphetamine-dextroamphetamine (Adderall) 10 mg tablet, Take 1 tablet (10 mg) by mouth 3 times a day for 270 doses., Disp: 270 tablet, Rfl: 0    buPROPion SR (Wellbutrin SR) 150 mg 12 hr tablet, Take 1 tablet (150 mg) by mouth twice a day., Disp: , Rfl:     LORazepam (Ativan) 0.5 mg tablet, Take 1 tablet (0.5 mg) by mouth every 8 hours if needed for anxiety., Disp: 270 tablet, Rfl: 0    pregabalin (Lyrica) 50 mg capsule, Take by mouth., Disp: , Rfl:     Interval Medical History:    Psychiatric Review Of Systems:   Depressive Symptoms: NA  Manic Symptoms: NA  Anxiety Symptoms:  "NA  Disordered Eating Symptoms: NA  Inattentive Symptoms: NA  Hyperactive/Impulsive Symptoms: NA  Trauma Symptoms: NA  Conduct Issues: NA  Psychotic Symptoms: NA  Developmental Concerns: NA  Other Symptoms/Concerns:NA  Delirium/Altered Mental Status Symptoms: NA       Objective   Mental Status Exam:     Patient is a well nourished, well developed white female  appearing to be approximately stated age  Appearance:   Well groomed, appropriately dressed   Attention:  Oriented X 3  Speech:         Form:  fluent, coherent, relevant, organized; evidence for formal thought disorder       Process:  abstract; without abnormal associations       Content:  without hallucinations, delusions,  denies suicidal or homicidal ideation  Movements: No apparent gait disturbance or abnormal involuntary movements of face or trunk  Mood:  \"Good\"  Affect:  Euthymic  Cognition:   Grossly intact and appropriate to level of educational attainment  Judgement:   Good  Insight:   Good       Vitals:  There were no vitals filed for this visit.      Total time spent 30\"      Jeff Burk MD MPH  "

## 2023-12-05 ENCOUNTER — APPOINTMENT (OUTPATIENT)
Dept: PHYSICAL THERAPY | Facility: CLINIC | Age: 78
End: 2023-12-05
Payer: MEDICARE

## 2023-12-07 ENCOUNTER — TELEMEDICINE (OUTPATIENT)
Dept: PRIMARY CARE | Facility: CLINIC | Age: 78
End: 2023-12-07
Payer: MEDICARE

## 2023-12-07 DIAGNOSIS — I82.4Y9 DEEP VEIN THROMBOSIS (DVT) OF PROXIMAL LOWER EXTREMITY, UNSPECIFIED CHRONICITY, UNSPECIFIED LATERALITY (MULTI): Primary | ICD-10-CM

## 2023-12-07 PROCEDURE — 99441 PR PHYS/QHP TELEPHONE EVALUATION 5-10 MIN: CPT | Performed by: INTERNAL MEDICINE

## 2023-12-07 NOTE — PROGRESS NOTES
Subjective   Patient ID: Diane Rao is a 78 y.o. female who presents for Results.    HPI     Review of Systems    Objective   There were no vitals taken for this visit.    Physical Exam    Assessment/Plan

## 2023-12-07 NOTE — PATIENT INSTRUCTIONS
Pt with unprovoked dvt in past, on life time coumadin, with home INR monitor  Did phone visit due to since July has been highly variable and prior to that had been stable  Likely related to  was out of town so pt was not eating any vegetables and then if high would hold the coumadin  D/w pt have some/small amount of vitamin K containing food daily and if INR slightly high increase amount instead of holding medicine.  Recheck INR in 2 weeks.     Telephone visit 9 min

## 2023-12-07 NOTE — PROGRESS NOTES
Subjective   Patient ID: Diane Rao is a 78 y.o. female who presents for Results.    Her INR has been all over the place  The only thing that has changed, is that she used to use advil for her pain  Last time we spoke she has changed to tylenol, is taking ES 500mg she takes 1 in the morning.   Is not taking any alcohol. None since 2013 and has been great, 10 year anniversary of no etoh.     Her diet change occurred and her  was out of town and eating junk and panotomy bread, has dried fruit in it  She has not been eating any vegetables when her  is away.   When   She is taking 2.5mg 6 days a week, misses Friday, but if INR was high. She would hold her coumadin for 1 day  She had to put her dog down recently, so has been struggling with that.   Has been eating more sugar than usual.  She is starting back to rehab for her back again.          Review of Systems    Objective   There were no vitals taken for this visit.    Physical Exam    Assessment/Plan

## 2023-12-26 ENCOUNTER — EVALUATION (OUTPATIENT)
Dept: PHYSICAL THERAPY | Facility: CLINIC | Age: 78
End: 2023-12-26
Payer: MEDICARE

## 2023-12-26 DIAGNOSIS — R53.1 WEAKNESS: ICD-10-CM

## 2023-12-26 DIAGNOSIS — G89.29 CHRONIC LEFT-SIDED LOW BACK PAIN WITH LEFT-SIDED SCIATICA: Primary | ICD-10-CM

## 2023-12-26 DIAGNOSIS — M54.42 CHRONIC LEFT-SIDED LOW BACK PAIN WITH LEFT-SIDED SCIATICA: Primary | ICD-10-CM

## 2023-12-26 PROCEDURE — 97110 THERAPEUTIC EXERCISES: CPT | Mod: GP

## 2023-12-26 PROCEDURE — 97161 PT EVAL LOW COMPLEX 20 MIN: CPT | Mod: GP

## 2023-12-26 ASSESSMENT — ENCOUNTER SYMPTOMS
DEPRESSION: 1
OCCASIONAL FEELINGS OF UNSTEADINESS: 1
LOSS OF SENSATION IN FEET: 1

## 2023-12-26 NOTE — PROGRESS NOTES
"Physical Therapy Evaluation     Patient Name: Diane Rao  MRN: [id number]  Today's Date: 12/26/23  Time Calculation  Start Time: 1308  Stop Time: 1355  Time Calculation (min): 47 min      Insurance:  Medicare             Current Problem:   1. 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 Weakness            Precautions:  Precautions  STEADI Fall Risk Score (The score of 4 or more indicates an increased risk of falling): 10  Medical Precautions: No known precautions/limitation      Reason for visit:  Chronic left low back pain with LLE sciatica  Referred by: Dr. Keyona Cam    Patient chief complaint is intermittent pain across LB and SI, buttocks, left side greater than right.  Pain and numbness increases in LLE with walking and standing.  Also, has left foot numbness and tingling.  Pain in both knees intermittently, and knees give out on her occasionally.  \"I'm losing all my energy\".     Work, mechanical stresses: Retired  Leisure, mechanical stresses:  Sedentary lifestyle, was walking  her dog daily up until about a month ago when she had to put  her dog down.    Functional disability from present episode: Walking limited, standing limited, housework difficult, sometimes stairs harder than other times. Difficulty with getting in and out of bed, up from chair, and up from the floor.   Present since: several year history,  initial injury was roller skating- landed on coccyx.  Pain since that time.  Present course worsening symptoms.     Pain ranges from: 0/10 sitting, 4/10 walking and standing on average.   Worst at 10/10.  Pt describes pain as: dull pain, sharp pain   Symptoms are worse with: bending, first few steps , standing - greater than 20 mins, walking, AM.  Symptoms are better with:   sitting , ice, pain meds,  lying, when still.    Disturbed sleep: no  Previous episodes: yes  Previous treatments: yes PT several years " "ago, beneficial  Imaging: No  Cough/sneeze/strain - yes painful  Bladder: incontinent of bladder, occasionally bowel.   Red Flags: recent/major surgery - no , night pain - no, accidents - no, unexplained weight loss - no  Medical history: see scanned form, OA in neck  and back, HTN, neuropathy    Objective Findings:     Posture:   Poor sitting and standing. FH,  trunk shifted right, forward bent, trunk rotated right, increased weight shift to right , left iliac crest elevated, left PSIS  elevated.    Lumbar AROM  Lumbar spine movement Loss -   Flexion: marked loss, decreased reversal of curve, no pain  Extension: marked loss, no pain   R SGIS:  (hips R) mod loss, no pain  L SGIS: (hips L) no loss    Repeated Movement Testing -    Repeated flexion in standing -  NT due to vertigo  Repeated extension in standing -  No worse, deviating to right   Repeated flexion in lying - no worse, may be slight decrease in symptoms  Repeated extension in lying - NT    ROM  ROM right LE WFL no pain,   ROM left hip WFL except ER 50% and IR 50%     Flexibility  Hamstrings L 48 deg painful, R 50 deg no pain  Gastrocs R/L fair  Piriformis L fair painful, R good    Strength  Hip flexion sitting R 4+/5, L 4/5  Hip flexion supine R 4-/5, L 4-/5  Hip abduction R 4-/5, L 3+/5  Bridge 50%  Knee extension R/L 5/5  Knee flexion R/L 4/5  Ankle DF R/L 4+5  Ankle PF R/L 4/5  Abdominals poor  Dynamic Lumbar stabilization poor  Postural endurance poor    Outcome Measures  Modified Oswestry Low Back Pain Disability Index score 27, 54%     Treatment:   Educated pt on  PT purpose and POC, proper postural alignment, compliance with HEP, proper response to exercise,  proper posture and  centralization/localization.  Initiated and performed HEP including hooklying TA brace with PPT 5\" hold x 10 reps; DKTC 5\" hold x 10 reps  HEP/med bridge:   Access Code: [email or web address]  URL: [email or web address]  Date: 12/26/2023  Prepared by: Nadine" "Roessler    Exercises  - Supine Posterior Pelvic Tilt  - 2 x daily - 7 x weekly - 2 sets - 10 reps - 5\" hold  - Supine Double Knee to Chest  - 1 x daily - 7 x weekly - 3 sets - 10 reps    Assessment:   Pt presents to PT with complaint of chronic LBP that will radiate into LLE with prolonged standing and walking.  Symptoms decrease with rest and sitting.  Directional preference appears to be lumbar flexion.  She also presents with postural deviations, pelvic obliquity, decreased and painful lumbar AROM, weakness in trunk and BLEs,  and poor dynamic lumbar stabilization.  Functional Limitations resulting from impairments include the following:  difficulty completing ADL/IADLs,  difficulty sitting or standing/walking  for prolonged periods, difficulty bending forward for dressing or picking up objects off the floor,  difficulty with sit to stand transitions, difficulty completing work around the home, difficulty participating in leisure activities and inability to participate in normal exercise program.      The patient will benefit from skilled PT services to address above listed impairments/deficits in order to improve functional abilities, decrease pain and return to PLOF and improve QoL.     OP PT Plan  Treatment/Interventions: Cryotherapy, Dry needling, Education/ Instruction, Electrical stimulation, Hot pack, Manual therapy, Neuromuscular re-education, Taping techniques, Therapeutic activities, Therapeutic exercises, Ultrasound  PT Plan: Skilled PT  PT Frequency: 2 times per week  Duration: 6-8 wks  Rehab Potential: Good  Plan of Care Agreement: Patient      PT Assessment  Evaluation/Treatment Tolerance: Patient limited by pain       Goals:  Active       PT Problem       Pain       Start:  12/26/23    Expected End:  02/28/24       Patient will report decrease in pain level  by >/= 75% in order to perform  ADLs, IADLs,  and leisure/household activities with minimal to no restrictions.          ROM       Start:  " 12/26/23    Expected End:  02/28/24       Patient will demonstrate increased lumbar AROM to WFL and painfree without compensation to ease the performance of ADLs and functional activities.           Strength       Start:  12/26/23    Expected End:  02/28/24       Patient will increase trunk and LE strength by 1 MMT grade to facilitate endurance for good postural alignment , to provide spinal protection during activity and static positions, and to enhance stamina for standing, walking and functional mobility.          HEP       Start:  12/26/23    Expected End:  02/28/24       Patient will demonstrate independence and compliance with safe and recommended  HEP in order to maximize and maintain functional gains made in physical therapy.          Outcome       Start:  01/03/24    Expected End:  02/28/24       Patient will improve outcome measures score on Modified Oswestry LBP Disability Index by 15%  to show a clinically significant improvement  in functional mobility.          Posture       Start:  12/26/23    Expected End:  02/28/24       Patient will demonstrate a good working knowledge of proper posture  and self correction in sitting and standing  50% of the time for greater for optimum symptom management/reduction.          Flexibility       Start:  12/26/23    Expected End:  02/28/24       Patient will demonstrate improved  flexibility in B hamstrings to 70 deg and piriformis to good  in order to decrease pain, improve positioning and promote improved functional mobility.

## 2024-01-02 PROBLEM — M54.42 CHRONIC LEFT-SIDED LOW BACK PAIN WITH LEFT-SIDED SCIATICA: Status: ACTIVE | Noted: 2024-01-02

## 2024-01-02 PROBLEM — G89.29 CHRONIC LEFT-SIDED LOW BACK PAIN WITH LEFT-SIDED SCIATICA: Status: ACTIVE | Noted: 2024-01-02

## 2024-01-03 ENCOUNTER — TREATMENT (OUTPATIENT)
Dept: PHYSICAL THERAPY | Facility: CLINIC | Age: 79
End: 2024-01-03
Payer: MEDICARE

## 2024-01-03 DIAGNOSIS — G89.29 CHRONIC LEFT-SIDED LOW BACK PAIN WITH LEFT-SIDED SCIATICA: ICD-10-CM

## 2024-01-03 DIAGNOSIS — M54.42 CHRONIC LEFT-SIDED LOW BACK PAIN WITH LEFT-SIDED SCIATICA: ICD-10-CM

## 2024-01-03 PROCEDURE — 97110 THERAPEUTIC EXERCISES: CPT | Mod: GP | Performed by: PHYSICAL THERAPIST

## 2024-01-03 NOTE — PROGRESS NOTES
"Physical Therapy    Physical Therapy Treatment    Patient Name: Diane Rao  MRN: [id number]  Today's Date: 1/3/2024  Time Calculation  Start Time: 1100  Stop Time: 1145  Time Calculation (min): 45 min      Assessment:  Patient did well with exercises required verbal cues for proper technique and to go slow with good muscle control.  Patient did not c/o pain during or after treatment.  Stated she felt \"good\" when she stood up to leave.     Plan:       Current Problem  1. Chronic left-sided low back pain with left-sided sciatica  Follow Up In Physical Therapy              Subjective   Patient reports pain along L Lower back and some tingling in her L foot this morning, rated pain at 1/10 pain level.  States she wants to get back to doing her exercises like she use to do.  Precautions:  (per patient she tends to get vertigo with any bending over act's)  STEADI Fall risk = 10  Medical precautions:  No known precautions        Pain:    L lower back pain rated at 1/10 pain level this morning, with mild tingling in L foot.       Objective   NT        Treatments:   Mat exercises:  Hook lying TA bracing with iso Hip adduction with ball between knees 5\" hold x 15 reps  Hook lying TA bracing with B Hip abduction with orange theraband 5\" hold x 15 reps  Hook lying Single KTC 5\" hold x 20 reps alt L/R  Posterior Pelvic tilts 5\" hold x 15 reps  Double KTC 5\" hold 2 x 10 reps  Hook lying slow Marches with TA bracing x 20 reps total  Supine Gluteal-sets with small bolster under knees 5-10\" hold x 15 reps  Supine Piriformis stretch with SKTC on diagonal 10\" hold x 3 reps L/R      OP EDUCATION:  Verbal cues to go slow with exercises and take note of where her pain is before she begins the exercise and after the exercise.  Discussed staying within pain free range with exercises, and to sit down after she has been standing for 10 minutes.  Also instructed in deep breathing with exercises.       Goals:    "

## 2024-01-08 ENCOUNTER — OFFICE VISIT (OUTPATIENT)
Dept: OTOLARYNGOLOGY | Facility: CLINIC | Age: 79
End: 2024-01-08
Payer: MEDICARE

## 2024-01-08 ENCOUNTER — APPOINTMENT (OUTPATIENT)
Dept: AUDIOLOGY | Facility: CLINIC | Age: 79
End: 2024-01-08
Payer: MEDICARE

## 2024-01-08 ENCOUNTER — CLINICAL SUPPORT (OUTPATIENT)
Dept: AUDIOLOGY | Facility: CLINIC | Age: 79
End: 2024-01-08
Payer: MEDICARE

## 2024-01-08 ENCOUNTER — APPOINTMENT (OUTPATIENT)
Dept: OTOLARYNGOLOGY | Facility: CLINIC | Age: 79
End: 2024-01-08
Payer: MEDICARE

## 2024-01-08 VITALS — BODY MASS INDEX: 31.07 KG/M2 | HEIGHT: 64 IN | WEIGHT: 182 LBS

## 2024-01-08 DIAGNOSIS — H90.3 BILATERAL SENSORINEURAL HEARING LOSS: Primary | ICD-10-CM

## 2024-01-08 DIAGNOSIS — H93.13 TINNITUS OF BOTH EARS: ICD-10-CM

## 2024-01-08 DIAGNOSIS — H90.3 SENSORINEURAL HEARING LOSS (SNHL) OF BOTH EARS: Primary | ICD-10-CM

## 2024-01-08 DIAGNOSIS — H93.13 BILATERAL TINNITUS: ICD-10-CM

## 2024-01-08 PROCEDURE — 1160F RVW MEDS BY RX/DR IN RCRD: CPT | Performed by: OTOLARYNGOLOGY

## 2024-01-08 PROCEDURE — 92557 COMPREHENSIVE HEARING TEST: CPT | Performed by: AUDIOLOGIST

## 2024-01-08 PROCEDURE — 1159F MED LIST DOCD IN RCRD: CPT | Performed by: OTOLARYNGOLOGY

## 2024-01-08 PROCEDURE — 92550 TYMPANOMETRY & REFLEX THRESH: CPT | Performed by: AUDIOLOGIST

## 2024-01-08 PROCEDURE — 1036F TOBACCO NON-USER: CPT | Performed by: OTOLARYNGOLOGY

## 2024-01-08 PROCEDURE — 99203 OFFICE O/P NEW LOW 30 MIN: CPT | Performed by: OTOLARYNGOLOGY

## 2024-01-08 NOTE — PROGRESS NOTES
AUDIOLOGY ADULT AUDIOMETRIC EVALUATION    Name:  Diane Rao  :  1945  Age:  78 y.o.  Date of Evaluation:  2024    Reason for visit: Ms. Rao is seen in the clinic today at the request of Daniel Rodriges MD in otolaryngology for an audiologic evaluation. Patient complains of difficulty hearing and tinnitus.    EVALUATION  See scanned audiogram: “Media” > “Audiology Report” > Document ID [id number].    RESULTS  Otoscopic Evaluation  Right Ear: non-occluding cerumen without visualization of the tympanic membrane  Left Ear: clear ear canal with visualization of the tympanic membrane     Immittance Measures  Tympanometry:  Right Ear: Type A, normal tympanic membrane mobility with normal middle ear pressure  Left Ear: Type A, normal tympanic membrane mobility with normal middle ear pressure    Acoustic Reflexes:  Ipsilateral Right Ear: present and normal from 500 Hz to 4000 Hz  Ipsilateral Left Ear: present and normal from 500 Hz to 4000 Hz  Contralateral Right Ear: did not evaluate  Contralateral Left Ear: did not evaluate    Distortion Product Otoacoustic Emissions (DPOAEs)  Right Ear: absent from 1000 Hz to 8000 Hz  Left Ear: present at 1000 Hz, absent from 1500 Hz to 8000 Hz    Audiometry  Test Technique and Reliability:   Standard audiometry via supra-aural headphones. Reliability is good.    Pure tone air and bone conduction audiometry:  Right Ear: normal hearing sensitivity through 1000 Hz with a mild to moderately-severe sensorineural hearing loss in the higher frequencies   Left Ear: normal hearing sensitivity through 1000 Hz with a mild to moderately-severe sensorineural hearing loss in the higher frequencies     Speech Audiometry:  Speech Reception Threshold (SRT) Right Ear: 30 dB HL  Speech Reception Threshold (SRT) Left Ear: 30 dB HL  Word Recognition Score (WRS) Right Ear: Excellent, 100% at 70 dB HL  Word Recognition Score (WRS) Left Ear: Excellent, 100% at 70 dB  HL    IMPRESSIONS  Audiometric evaluation revealed high frequency sensorineural hearing loss bilaterally. The presence of acoustic reflexes within normal intensity limits is consistent with normal middle ear and brainstem function, and suggests that auditory sensitivity is not significantly impaired. Present Distortion Product Otoacoustic Emissions (DPOAEs) suggest normal/near normal cochlear outer hair cell function and are consistent with no greater than a mild hearing loss at those frequencies. Absent DPOAEs are consistent with abnormal cochlear outer hair cell function and some degree of hearing loss at those frequencies.    RECOMMENDATIONS  - Follow up with otolaryngology today as scheduled.  - Annual audiologic evaluation, sooner if an acute change is noted.  - Consider hearing aids. Contact insurance to determine if there is an applicable benefit and where it can be used. Contact our office to schedule a hearing aid evaluation appointment should she wish to proceed with hearing aids through our clinic.  - Follow-up with medical care team as planned.    PATIENT EDUCATION  Discussed results, impressions and recommendations with the patient. Questions were addressed and the patient was encouraged to contact our office should concerns arise.    Time for this encounter: 0729-6204    Aravind Rodriguez, CCC-A  Licensed Audiologist

## 2024-01-08 NOTE — PROGRESS NOTES
Chief Complaint   Patient presents with    Hearing Problem     NP- HEARING PROBLEMS AND TINNITUS, HAD AUDIO DONE     HPI:  Diane Rao is a 78 y.o. female who complains of about a 3-year history of some bilateral hearing loss as well as tinnitus.  No pressure, pain, vertigo.  She does have a history of what sounds like benign positional vertigo in the past which was treated with balance therapy.  She did have an audiogram which does show bilateral symmetric sloping moderate to severe sensorineural hearing loss.    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    Dysphagia, unspecified 07/07/2014    Dysphagia    Lumbago with sciatica, right side 07/20/2018    Acute right-sided low back pain with right-sided sciatica    Other chest pain 06/05/2014    Atypical chest pain    Other conditions influencing health status     Alcoholism    Other conditions influencing health status     Pneumonia    Other conditions influencing health status     Closed Rib Fracture    Other specified abnormal findings of blood chemistry 07/03/2014    Elevated liver function tests    Pain in unspecified joint 02/21/2014    Arthralgia    Personal history of other diseases of the circulatory system     History of essential hypertension    Personal history of other diseases of the nervous system and sense organs 01/26/2017    History of trigeminal neuralgia    Personal history of other diseases of the  respiratory system 07/14/2015    History of upper respiratory infection    Personal history of other infectious and parasitic diseases 05/11/2016    History of herpes zoster    Personal history of other mental and behavioral disorders     History of attention deficit hyperactivity disorder    Personal history of other specified conditions 03/27/2015    History of fatigue    Personal history of other specified conditions 07/07/2014    History of abdominal pain     Past Surgical History:   Procedure Laterality Date    BREAST BIOPSY Left     benign    OTHER SURGICAL HISTORY  08/09/2013    Interruption Inferior Vena Cava [place] Filter Placement    OTHER SURGICAL HISTORY  08/09/2013    Nephrectomy    TONSILLECTOMY  08/09/2013    Tonsillectomy         Medications:     Current Outpatient Medications:     amLODIPine (Norvasc) 5 mg tablet, TAKE 1 TABLET ONCE DAILY, Disp: 90 tablet, Rfl: 1    amphetamine-dextroamphetamine (Adderall) 10 mg tablet, Take 1 tablet (10 mg) by mouth 3 times a day for 270 doses., Disp: 270 tablet, Rfl: 0    atorvastatin (Lipitor) 40 mg tablet, TAKE 1 TABLET ONCE DAILY, Disp: 90 tablet, Rfl: 1    cholecalciferol (Vitamin D-3) 5,000 Units tablet, Take 1 tablet (5,000 Units) by mouth once daily., Disp: , Rfl:     estradiol (Estrace) 0.01 % (0.1 mg/gram) vaginal cream, Insert into the vagina., Disp: , Rfl:     FLUoxetine (PROzac) 20 mg tablet, Take by mouth twice a day., Disp: , Rfl:     levothyroxine (Synthroid, Levoxyl) 50 mcg tablet, Take 1 tablet (50 mcg) by mouth once daily., Disp: 90 tablet, Rfl: 3    LORazepam (Ativan) 0.5 mg tablet, Take 1 tablet (0.5 mg) by mouth every 8 hours if needed for anxiety., Disp: 270 tablet, Rfl: 0    metoprolol tartrate (Lopressor) 25 mg tablet, Take 1 tablet (25 mg) by mouth 2 times a day., Disp: 180 tablet, Rfl: 3    triamcinolone (Kenalog) 0.025 % cream, Apply 1 Application topically. Apply sparingly to affected area(s) 2 to 3 times isacy, Disp: , Rfl:      "warfarin (Jantoven) 2.5 mg tablet, TAKE 1 TABLET (2.5MG)ONCE  DAILY AT BEDTIME EXCEPT    NONE ON FRIDAY., Disp: 78 tablet, Rfl: 3    buPROPion SR (Wellbutrin SR) 150 mg 12 hr tablet, Take 1 tablet (150 mg) by mouth twice a day., Disp: , Rfl:     cholecalciferol (Vitamin D-3) 50 mcg (2,000 unit) capsule, Take 1 capsule (50 mcg) by mouth once daily., Disp: , Rfl:     clindamycin (Cleocin T) 1 % external solution, Apply 1 Application topically in the morning and 1 Application before bedtime. To affected areas for underarms for 2-4 weeks as needed for flares/order., Disp: 60 mL, Rfl: 0    pregabalin (Lyrica) 50 mg capsule, Take by mouth., Disp: , Rfl:      Allergies:  Allergies   Allergen Reactions    Thimerosal Itching and Photosensitivity    Iodinated Contrast Media Hives    Iodine Rash        ROS:  Review of systems normal unless stated otherwise in the HPI and/or PMH.    Physical Exam:  Height 1.626 m (5' 4\"), weight 82.6 kg (182 lb). Body mass index is 31.24 kg/m².     GENERAL APPEARANCE: Well developed and well nourished.  Alert and oriented in no acute distress.  Normal vocal quality.      HEAD/FACE: No erythema or edema or facial tenderness.  Normal facial nerve function bilaterally.    EAR:       EXTERNAL: Normal pinnas and external auditory canals without lesion or obstructing wax.  Wax removed from the right with alligators at bedside.       MIDDLE EAR: Tympanic membranes intact and mobile with normal landmarks.  Middle ear space appears well aerated.       TUBE STATUS: N/A       MASTOID CAVITY: N/A       HEARING: Gross hearing assessment is within normal limits.      NOSE:       VISUALIZED USING: Anterior rhinoscopy with headlight and nasal speculum.       DORSUM: Midline, nontraumatic appearance.       MUCOSA: Normal-appearing.       SECRETIONS: Normal.       SEPTUM: Midline and nonobstructing.       INFERIOR TURBINATES: Normal.       MIDDLE TURBINATES/MEATUS: N/A       BLEEDING: N/A         ORAL " CAVITY/PHARYNX:       TEETH: Edentulous with dentures       TONGUE: No mass or lesion.  Normal mobility.       FLOOR OF MOUTH: No mass or lesion.       PALATE: Normal hard palate, soft palate, and uvula.       OROPHARYNX: Normal without mass or lesion.  Tonsils absent       BUCCAL MUCOSA/GBS: Normal without mass or lesion.       LIPS: Normal.    LARYNX/HYPOPHARYNX/NASOPHARYNX: N/A    NECK: No palpable masses or abnormal adenopathy.  Trachea is midline.    THYROID: No thyromegaly or palpable nodule.    SALIVARY GLANDS: Normal bilateral parotid and submandibular glands by inspection and palpation.    TMJ's: Normal.    NEURO: Cranial nerve exam grossly normal bilaterally.       Assessment/Plan   Diane was seen today for hearing problem.  Diagnoses and all orders for this visit:  Bilateral sensorineural hearing loss (Primary)  Bilateral tinnitus     Educated about her hearing loss being related to a combination genetics and environment.  Control environment with hearing protection and masking techniques.  I do also recommend hearing aids.  She will schedule appointment with the audiologist  Follow up for hearing aid evaluation with the audiologist.     Daniel Rodriges MD

## 2024-01-10 PROBLEM — F41.1 GAD (GENERALIZED ANXIETY DISORDER): Status: ACTIVE | Noted: 2024-01-10

## 2024-01-12 ENCOUNTER — TREATMENT (OUTPATIENT)
Dept: PHYSICAL THERAPY | Facility: CLINIC | Age: 79
End: 2024-01-12
Payer: MEDICARE

## 2024-01-12 DIAGNOSIS — R53.1 WEAKNESS: ICD-10-CM

## 2024-01-12 DIAGNOSIS — M54.42 CHRONIC LEFT-SIDED LOW BACK PAIN WITH LEFT-SIDED SCIATICA: Primary | ICD-10-CM

## 2024-01-12 DIAGNOSIS — G89.29 CHRONIC LEFT-SIDED LOW BACK PAIN WITH LEFT-SIDED SCIATICA: Primary | ICD-10-CM

## 2024-01-12 PROCEDURE — 97110 THERAPEUTIC EXERCISES: CPT | Mod: GP | Performed by: PHYSICAL THERAPIST

## 2024-01-12 NOTE — PROGRESS NOTES
"Physical Therapy    Physical Therapy Treatment    Patient Name: Diane Rao  MRN: [id number]  Today's Date: 1/12/2024  Time Calculation  Start Time: 1115  Stop Time: 1157  Time Calculation (min): 42 min    Visit 3 - no auth   Assessment:   Patient had mild pain in L hip with abduction but with pausing the exercise the pain decreased.  Patient did report pulling in B shoulders with single knee to chest exercise.   Reps were limited to 1 x 15 secondary to reports of soreness from last session.  Patient did not have any tingling in L LE during session.   Plan:   Continue per POC.  Monitor response to treatment and adjust plan as needed.         Current Problem  1. Chronic left-sided low back pain with left-sided sciatica  Follow Up In Physical Therapy      2. Weakness            General   Patient reported pain is 0/10 today.  The day after her last workout she was sore in her back and neck and knees.   She has not been attempting to stand/walk long distances so she is unsure how exercises are affecting her L LE radicular pain.       General  Reason for Referral: Chronic left low back pain with LLE sciatica  Referred By: Dr. Keyona Cam  Past Medical History Relevant to Rehab: HTN, neuropathy    Subjective    Precautions  Precautions  STEADI Fall Risk Score (The score of 4 or more indicates an increased risk of falling): 10  Medical Precautions: No known precautions/limitation       Pain   0/10    Objective     Treatments:    Sci Fit x 5 minutes level 1 resistance seat 11   Mat exercises: (on pillow with wedge to ensure cervical spine is properly supported)  Hook lying TA bracing with iso Hip adduction with ball between knees 5\" hold x 15 reps  Hook lying TA bracing with B Hip abduction with orange theraband 5\" hold x 15 reps  Hook lying Single KTC 5\" hold x 15 reps alt L/R  Posterior Pelvic tilts 5\" hold x 15 reps  Double KTC 5\" hold  x 10 reps  Hook lying slow Marches with TA bracing x 20 reps total  Supine " "Gluteal-sets with small bolster under knees 5-10\" hold x 15 reps  Seated Piriformis stretch - figure four x 30 second x 2 times R/L   Seated trunk flexion stretch 2 x 5   OP EDUCATION:   Educated to avoid pulling on knees with knee to chest exercises and decrease sets to 2 as she is reporting performing 3 sets     Goals:  Active       PT Problem       Pain       Start:  12/26/23    Expected End:  02/28/24       Patient will report decrease in pain level  by >/= 75% in order to perform  ADLs, IADLs,  and leisure/household activities with minimal to no restrictions.          ROM       Start:  12/26/23    Expected End:  02/28/24       Patient will demonstrate increased lumbar AROM to WFL and painfree without compensation to ease the performance of ADLs and functional activities.           Strength       Start:  12/26/23    Expected End:  02/28/24       Patient will increase trunk and LE strength by 1 MMT grade to facilitate endurance for good postural alignment , to provide spinal protection during activity and static positions, and to enhance stamina for standing, walking and functional mobility.          HEP       Start:  12/26/23    Expected End:  02/28/24       Patient will demonstrate independence and compliance with safe and recommended  HEP in order to maximize and maintain functional gains made in physical therapy.          Outcome       Start:  01/03/24    Expected End:  02/28/24       Patient will improve outcome measures score on Modified Oswestry LBP Disability Index by 15%  to show a clinically significant improvement  in functional mobility.          Posture       Start:  12/26/23    Expected End:  02/28/24       Patient will demonstrate a good working knowledge of proper posture  and self correction in sitting and standing  50% of the time for greater for optimum symptom management/reduction.          Flexibility       Start:  12/26/23    Expected End:  02/28/24       Patient will demonstrate improved  " flexibility in B hamstrings to 70 deg and piriformis to good  in order to decrease pain, improve positioning and promote improved functional mobility.

## 2024-01-16 ENCOUNTER — TREATMENT (OUTPATIENT)
Dept: PHYSICAL THERAPY | Facility: CLINIC | Age: 79
End: 2024-01-16
Payer: MEDICARE

## 2024-01-16 DIAGNOSIS — G89.29 CHRONIC LEFT-SIDED LOW BACK PAIN WITH LEFT-SIDED SCIATICA: ICD-10-CM

## 2024-01-16 DIAGNOSIS — M54.42 CHRONIC LEFT-SIDED LOW BACK PAIN WITH LEFT-SIDED SCIATICA: ICD-10-CM

## 2024-01-16 DIAGNOSIS — R53.1 WEAKNESS: Primary | ICD-10-CM

## 2024-01-16 PROCEDURE — 97110 THERAPEUTIC EXERCISES: CPT | Mod: GP | Performed by: PHYSICAL THERAPIST

## 2024-01-16 NOTE — PROGRESS NOTES
Physical Therapy    Physical Therapy Treatment    Patient Name: Diane Rao  MRN: [id number]  Today's Date: 1/16/2024  Time Calculation  Start Time: 1515  Stop Time: 1559  Time Calculation (min): 44 min  Visit 4 - no auth    Current Problem  1. Weakness        2. Chronic left-sided low back pain with left-sided sciatica  Follow Up In Physical Therapy          Goals:  Active       PT Problem       Pain       Start:  12/26/23    Expected End:  02/28/24       Patient will report decrease in pain level  by >/= 75% in order to perform  ADLs, IADLs,  and leisure/household activities with minimal to no restrictions.          ROM       Start:  12/26/23    Expected End:  02/28/24       Patient will demonstrate increased lumbar AROM to WFL and painfree without compensation to ease the performance of ADLs and functional activities.           Strength       Start:  12/26/23    Expected End:  02/28/24       Patient will increase trunk and LE strength by 1 MMT grade to facilitate endurance for good postural alignment , to provide spinal protection during activity and static positions, and to enhance stamina for standing, walking and functional mobility.          HEP       Start:  12/26/23    Expected End:  02/28/24       Patient will demonstrate independence and compliance with safe and recommended  HEP in order to maximize and maintain functional gains made in physical therapy.          Outcome       Start:  01/03/24    Expected End:  02/28/24       Patient will improve outcome measures score on Modified Oswestry LBP Disability Index by 15%  to show a clinically significant improvement  in functional mobility.          Posture       Start:  12/26/23    Expected End:  02/28/24       Patient will demonstrate a good working knowledge of proper posture  and self correction in sitting and standing  50% of the time for greater for optimum symptom management/reduction.          Flexibility       Start:  12/26/23    Expected End:   "02/28/24       Patient will demonstrate improved  flexibility in B hamstrings to 70 deg and piriformis to good  in order to decrease pain, improve positioning and promote improved functional mobility.             Assessment:  Pt reported 0/10 pain after today's treatment session.  She stated that she felt 'really good' during PT session today.      Plan:   Continue per POC.  Monitor response to treatment and adjust plan as needed.         General  Patient reports pain is 4/10 today in back.  No changes in symptoms since beginning PT.  She has not been attempting to stand/walk long distances so she is unsure how exercises are affecting her L LE radicular pain.       General  Reason for Referral: Chronic left low back pain with LLE sciatica  Referred By: Dr. Keyona Cam  Past Medical History Relevant to Rehab: HTN, neuropathy    Subjective    Precautions  Precautions  STEADI Fall Risk Score (The score of 4 or more indicates an increased risk of falling): 10  Medical Precautions: No known precautions/limitation       Pain   0/10    Objective     Treatments:  Therapeutic Exercise:  Sci Fit x 5 minutes level 1.0 resistance seat 11   Mat exercises: pillow with towel roll for cervical support under head  Hook lying TA bracing with iso Hip adduction with ball between knees 5\" hold x 15 reps  Hook lying TA bracing with B Hip abduction with orange theraband 5\" hold x 15 reps  Hook lying Single KTC 5\" hold x 15 reps alt L/R  Posterior Pelvic tilts 5\" hold x 15 reps  Double KTC 5\" hold  x 10 reps  Hook lying slow Marches with TA bracing x 20 reps total  Supine Gluteal-sets with large bolster under knees 5\" hold x 15 reps  Seated Piriformis stretch - figure four x 30 second x 2 times R/L   Isometric crunch pushing into ball on upper leg x 15 (5 second hold)  Supine hip flexor stretch with leg off edge of table 2 x 30 seconds L/R    Seated trunk flexion stretch with large swiss ball x 10  Seated hamstring stretch 2 x 30 " "seconds L/R  MIP with orange band above knees x 15 L/R  Seated scapular retraction x 15 bilateral      OP EDUCATION:  Correct exercise technique  Pt to continue current HEP as tolerated  Pt verbalized understanding    Access Code: [email or web address]  URL: [email or web address]  Date: 12/26/2023  Prepared by: Nadine Britton     Exercises  - Supine Posterior Pelvic Tilt  - 2 x daily - 7 x weekly - 2 sets - 10 reps - 5\" hold  - Supine Double Knee to Chest  - 1 x daily - 7 x weekly - 3 sets - 10 reps        "

## 2024-01-19 ENCOUNTER — APPOINTMENT (OUTPATIENT)
Dept: PHYSICAL THERAPY | Facility: CLINIC | Age: 79
End: 2024-01-19
Payer: MEDICARE

## 2024-01-22 ENCOUNTER — CLINICAL SUPPORT (OUTPATIENT)
Dept: AUDIOLOGY | Facility: CLINIC | Age: 79
End: 2024-01-22
Payer: MEDICARE

## 2024-01-22 DIAGNOSIS — H90.3 SENSORINEURAL HEARING LOSS (SNHL) OF BOTH EARS: Primary | ICD-10-CM

## 2024-01-22 PROCEDURE — HRANC PR HEARING AID NO CHARGE: Performed by: AUDIOLOGIST

## 2024-01-22 NOTE — PROGRESS NOTES
AUDIOLOGY  ADULT HEARING AID EVALUATION    Name:  Diane Rao  :  1945  Age:  78 y.o.  Date of Service:  2024    Reason for visit: Ms. Rao is seen in the clinic today for a hearing aid evaluation.    HISTORY  Patient reports increased difficulty hearing in background noise. Audiogram dated 2024 revealed normal hearing sensitivity through 1000 Hz with a mild to moderately-severe sensorineural hearing loss in the higher frequencies bilaterally. Word recognition ability was excellent bilaterally. Patient saw Daniel Rodriges MD in conjunction with audiology that day and hearing aids were recommended.    Patient has never worn hearing aids. She wears glasses, but denies significant vision issues. No obvious dexterity issues. She has an iPhone. No hearing aid benefit expected.    RESULTS  HEARING AID EVALUATION  Client Oriented Scale of Improvement (COSI) goals:  1. Hear  easier  2. [place] medical providers easier (such as the nurses at rehab)  3. Hearing in background noise better    Communication difficulties, amplification options and potential benefits/limitations of hearing aids were discussed. Specifically discussed hearing aid styles, technology levels, and monaural versus binaural hearing aids. Also discussed the option to do nothing. Explained cell phone connectivity options, traditional battery versus rechargeable, water resistant versus water proof hearing aids, trial period, repair warranty, and loss/damage warranty. Explained that there is no fee for hearing aid office visits within one year of hearing aid fitting; however, after one year there will be a fee.    IMPRESSIONS  Patient will consider and contact me when she is ready to proceed with ordering devices.    Recommended a trial of rechargeable  in the ear (RADHA) hearing aids bilaterally. Specifically[facility] 9 [id number] miniRIE rechargeable  in the ear (RADHA) hearing aids in sparkling  silver with 1LP receivers, small open domes, and no retention lines. New user. iPhone. Self-pay. RAQUEL.    RECOMMENDATIONS  - Patient will consider and contact me should she wish to proceed with ordering hearing aids.  - Encouraged patient to check with insurance to verify if there is or is not a hearing aid benefit.  - Annual audiologic evaluation, sooner if an acute change is noted.  - Follow-up with otolaryngology as planned.  - Follow-up with medical care team as planned.    PATIENT EDUCATION  Discussed results, impressions and recommendations with the patient. Questions were addressed and the patient was encouraged to contact our office should concerns arise.    Time for this encounter: 1583-4564    Aravind Rodriguez, CCC-A  Licensed Audiologist

## 2024-01-23 ENCOUNTER — TREATMENT (OUTPATIENT)
Dept: PHYSICAL THERAPY | Facility: CLINIC | Age: 79
End: 2024-01-23
Payer: MEDICARE

## 2024-01-23 DIAGNOSIS — M54.42 CHRONIC LEFT-SIDED LOW BACK PAIN WITH LEFT-SIDED SCIATICA: Primary | ICD-10-CM

## 2024-01-23 DIAGNOSIS — R53.1 WEAKNESS: ICD-10-CM

## 2024-01-23 DIAGNOSIS — G89.29 CHRONIC LEFT-SIDED LOW BACK PAIN WITH LEFT-SIDED SCIATICA: Primary | ICD-10-CM

## 2024-01-23 PROCEDURE — 97110 THERAPEUTIC EXERCISES: CPT | Mod: GP | Performed by: PHYSICAL THERAPIST

## 2024-01-23 NOTE — PROGRESS NOTES
"Physical Therapy    Physical Therapy Treatment    Patient Name: Diane Rao  MRN: [id number]  Today's Date: 1/23/2024  Time Calculation  Start Time: 1430  Stop Time: 1512  Time Calculation (min): 42 min      Assessment:   Patient is tolerating exercises well with no increase in pain.  Patient was able to tolerate increased reps and resistance today but does fatigue.  Patient will continue to benefit from PT to improve mobility.   Plan:   Continue per POC.  Monitor response to treatment and adjust plan as needed.         Current Problem  1. Chronic left-sided low back pain with left-sided sciatica  Follow Up In Physical Therapy      2. Weakness            General   Patient reported pain is in L mid thoracic that is new starting rehab but overall she is improving.  4/10 in midback.  Ice seems to help.  Patient was having to lift LE into car prior.   General  Reason for Referral: Chronic left low back pain with LLE sciatica  Referred By: Dr. Keyona Cam  Past Medical History Relevant to Rehab: HTN, neuropathy    Subjective    Precautions   STEADI Fall Risk Score (The score of 4 or more indicates an increased risk of falling): 10  Medical Precautions: No known precautions/limitation       Pain   4/10 L thoracic     Objective     Treatments:  Therapeutic Exercise:  Sci Fit x 5 minutes level 1.0 resistance seat 11   Mat exercises: pillow with towel roll for cervical support under head  Hook lying TA bracing with iso Hip adduction with ball between knees 5\" hold x 15 reps  Hook lying TA bracing with B Hip abduction with green theraband 5\" hold x 20 reps  Hook lying Single KTC 5\" hold x 15 reps alt L/R  Posterior Pelvic tilts 5\" hold x 15 reps  Double KTC 5\" hold  x 15 reps  Hook lying slow Marches with TA bracing x 20 reps total with green band   Supine Gluteal-sets with large bolster under knees 5\" hold x 15 reps  Seated Piriformis stretch - figure four x 30 second x 2 times R/L   Isometric crunch pushing into ball " on upper leg x 15 (5 second hold)  Supine hip flexor stretch with leg off edge of table 2 x 30 seconds L/R   Seated trunk flexion stretch with large swiss ball x 10  Seated hamstring stretch 2 x 30 seconds L/R  Seated scapular retraction x 15 bilateral  Supine horizontal abduction shoulders with orange band x 10   OP EDUCATION:   Educated to continue with HEP     Goals:  Active       PT Problem       Pain       Start:  12/26/23    Expected End:  02/28/24       Patient will report decrease in pain level  by >/= 75% in order to perform  ADLs, IADLs,  and leisure/household activities with minimal to no restrictions.          ROM       Start:  12/26/23    Expected End:  02/28/24       Patient will demonstrate increased lumbar AROM to WFL and painfree without compensation to ease the performance of ADLs and functional activities.           Strength       Start:  12/26/23    Expected End:  02/28/24       Patient will increase trunk and LE strength by 1 MMT grade to facilitate endurance for good postural alignment , to provide spinal protection during activity and static positions, and to enhance stamina for standing, walking and functional mobility.          HEP       Start:  12/26/23    Expected End:  02/28/24       Patient will demonstrate independence and compliance with safe and recommended  HEP in order to maximize and maintain functional gains made in physical therapy.          Outcome       Start:  01/03/24    Expected End:  02/28/24       Patient will improve outcome measures score on Modified Oswestry LBP Disability Index by 15%  to show a clinically significant improvement  in functional mobility.          Posture       Start:  12/26/23    Expected End:  02/28/24       Patient will demonstrate a good working knowledge of proper posture  and self correction in sitting and standing  50% of the time for greater for optimum symptom management/reduction.          Flexibility       Start:  12/26/23    Expected End:   02/28/24       Patient will demonstrate improved  flexibility in B hamstrings to 70 deg and piriformis to good  in order to decrease pain, improve positioning and promote improved functional mobility.

## 2024-01-26 ENCOUNTER — APPOINTMENT (OUTPATIENT)
Dept: RADIOLOGY | Facility: HOSPITAL | Age: 79
End: 2024-01-26
Payer: MEDICARE

## 2024-01-26 ENCOUNTER — TREATMENT (OUTPATIENT)
Dept: PHYSICAL THERAPY | Facility: CLINIC | Age: 79
End: 2024-01-26
Payer: MEDICARE

## 2024-01-26 ENCOUNTER — HOSPITAL ENCOUNTER (EMERGENCY)
Facility: HOSPITAL | Age: 79
Discharge: HOME | End: 2024-01-26
Payer: MEDICARE

## 2024-01-26 VITALS
RESPIRATION RATE: 17 BRPM | BODY MASS INDEX: 29.99 KG/M2 | DIASTOLIC BLOOD PRESSURE: 66 MMHG | TEMPERATURE: 97.2 F | SYSTOLIC BLOOD PRESSURE: 130 MMHG | OXYGEN SATURATION: 95 % | HEART RATE: 71 BPM | WEIGHT: 180 LBS | HEIGHT: 65 IN

## 2024-01-26 DIAGNOSIS — M54.42 CHRONIC LEFT-SIDED LOW BACK PAIN WITH LEFT-SIDED SCIATICA: ICD-10-CM

## 2024-01-26 DIAGNOSIS — G89.29 CHRONIC LEFT-SIDED LOW BACK PAIN WITH LEFT-SIDED SCIATICA: ICD-10-CM

## 2024-01-26 DIAGNOSIS — M71.22 BAKER'S CYST OF KNEE, LEFT: Primary | ICD-10-CM

## 2024-01-26 LAB
ALBUMIN SERPL BCP-MCNC: 4.1 G/DL (ref 3.4–5)
ALP SERPL-CCNC: 96 U/L (ref 33–136)
ALT SERPL W P-5'-P-CCNC: 22 U/L (ref 7–45)
ANION GAP SERPL CALC-SCNC: 14 MMOL/L (ref 10–20)
APTT PPP: 44 SECONDS (ref 27–38)
AST SERPL W P-5'-P-CCNC: 29 U/L (ref 9–39)
BASOPHILS # BLD AUTO: 0.05 X10*3/UL (ref 0–0.1)
BASOPHILS NFR BLD AUTO: 0.5 %
BILIRUB SERPL-MCNC: 0.7 MG/DL (ref 0–1.2)
BUN SERPL-MCNC: 21 MG/DL (ref 6–23)
CALCIUM SERPL-MCNC: 9.1 MG/DL (ref 8.6–10.3)
CHLORIDE SERPL-SCNC: 100 MMOL/L (ref 98–107)
CO2 SERPL-SCNC: 28 MMOL/L (ref 21–32)
CREAT SERPL-MCNC: 0.84 MG/DL (ref 0.5–1.05)
EGFRCR SERPLBLD CKD-EPI 2021: 72 ML/MIN/1.73M*2
EOSINOPHIL # BLD AUTO: 0.16 X10*3/UL (ref 0–0.4)
EOSINOPHIL NFR BLD AUTO: 1.5 %
ERYTHROCYTE [DISTWIDTH] IN BLOOD BY AUTOMATED COUNT: 13.1 % (ref 11.5–14.5)
GLUCOSE SERPL-MCNC: 80 MG/DL (ref 74–99)
HCT VFR BLD AUTO: 43.4 % (ref 36–46)
HGB BLD-MCNC: 14 G/DL (ref 12–16)
IMM GRANULOCYTES # BLD AUTO: 0.04 X10*3/UL (ref 0–0.5)
IMM GRANULOCYTES NFR BLD AUTO: 0.4 % (ref 0–0.9)
INR PPP: 2.4 (ref 0.9–1.1)
LYMPHOCYTES # BLD AUTO: 2.16 X10*3/UL (ref 0.8–3)
LYMPHOCYTES NFR BLD AUTO: 20.2 %
MCH RBC QN AUTO: 29.5 PG (ref 26–34)
MCHC RBC AUTO-ENTMCNC: 32.3 G/DL (ref 32–36)
MCV RBC AUTO: 92 FL (ref 80–100)
MONOCYTES # BLD AUTO: 1.37 X10*3/UL (ref 0.05–0.8)
MONOCYTES NFR BLD AUTO: 12.8 %
NEUTROPHILS # BLD AUTO: 6.92 X10*3/UL (ref 1.6–5.5)
NEUTROPHILS NFR BLD AUTO: 64.6 %
NRBC BLD-RTO: 0 /100 WBCS (ref 0–0)
PLATELET # BLD AUTO: 312 X10*3/UL (ref 150–450)
POTASSIUM SERPL-SCNC: 5.2 MMOL/L (ref 3.5–5.3)
PROT SERPL-MCNC: 6.7 G/DL (ref 6.4–8.2)
PROTHROMBIN TIME: 26.9 SECONDS (ref 9.8–12.8)
RBC # BLD AUTO: 4.74 X10*6/UL (ref 4–5.2)
SODIUM SERPL-SCNC: 137 MMOL/L (ref 136–145)
WBC # BLD AUTO: 10.7 X10*3/UL (ref 4.4–11.3)

## 2024-01-26 PROCEDURE — 93971 EXTREMITY STUDY: CPT

## 2024-01-26 PROCEDURE — 36415 COLL VENOUS BLD VENIPUNCTURE: CPT | Performed by: NURSE PRACTITIONER

## 2024-01-26 PROCEDURE — 80053 COMPREHEN METABOLIC PANEL: CPT | Performed by: NURSE PRACTITIONER

## 2024-01-26 PROCEDURE — 85730 THROMBOPLASTIN TIME PARTIAL: CPT | Performed by: NURSE PRACTITIONER

## 2024-01-26 PROCEDURE — 73562 X-RAY EXAM OF KNEE 3: CPT | Mod: LT

## 2024-01-26 PROCEDURE — 99284 EMERGENCY DEPT VISIT MOD MDM: CPT | Mod: 27

## 2024-01-26 PROCEDURE — 73562 X-RAY EXAM OF KNEE 3: CPT | Mod: LEFT SIDE

## 2024-01-26 PROCEDURE — 93971 EXTREMITY STUDY: CPT | Performed by: RADIOLOGY

## 2024-01-26 PROCEDURE — 2500000005 HC RX 250 GENERAL PHARMACY W/O HCPCS: Performed by: NURSE PRACTITIONER

## 2024-01-26 PROCEDURE — 85025 COMPLETE CBC W/AUTO DIFF WBC: CPT | Performed by: NURSE PRACTITIONER

## 2024-01-26 RX ORDER — LIDOCAINE 560 MG/1
2 PATCH PERCUTANEOUS; TOPICAL; TRANSDERMAL ONCE
Status: DISCONTINUED | OUTPATIENT
Start: 2024-01-26 | End: 2024-01-26 | Stop reason: HOSPADM

## 2024-01-26 RX ORDER — LIDOCAINE 50 MG/G
1 PATCH TOPICAL DAILY
Qty: 7 PATCH | Refills: 0 | Status: SHIPPED | OUTPATIENT
Start: 2024-01-26 | End: 2024-02-02

## 2024-01-26 RX ORDER — ACETAMINOPHEN 325 MG/1
975 TABLET ORAL ONCE
Status: COMPLETED | OUTPATIENT
Start: 2024-01-26 | End: 2024-01-26

## 2024-01-26 RX ADMIN — ACETAMINOPHEN 975 MG: 325 TABLET ORAL at 14:41

## 2024-01-26 RX ADMIN — LIDOCAINE 2 PATCH: 4 PATCH TOPICAL at 14:42

## 2024-01-26 ASSESSMENT — PAIN - FUNCTIONAL ASSESSMENT
PAIN_FUNCTIONAL_ASSESSMENT: 0-10

## 2024-01-26 ASSESSMENT — PAIN SCALES - GENERAL
PAINLEVEL_OUTOF10: 4
PAINLEVEL_OUTOF10: 0 - NO PAIN
PAINLEVEL_OUTOF10: 4

## 2024-01-26 ASSESSMENT — LIFESTYLE VARIABLES
HAVE PEOPLE ANNOYED YOU BY CRITICIZING YOUR DRINKING: NO
HAVE YOU EVER FELT YOU SHOULD CUT DOWN ON YOUR DRINKING: NO
EVER FELT BAD OR GUILTY ABOUT YOUR DRINKING: NO
EVER HAD A DRINK FIRST THING IN THE MORNING TO STEADY YOUR NERVES TO GET RID OF A HANGOVER: NO
REASON UNABLE TO ASSESS: NO

## 2024-01-26 ASSESSMENT — PAIN DESCRIPTION - ORIENTATION
ORIENTATION: LEFT

## 2024-01-26 ASSESSMENT — COLUMBIA-SUICIDE SEVERITY RATING SCALE - C-SSRS
2. HAVE YOU ACTUALLY HAD ANY THOUGHTS OF KILLING YOURSELF?: NO
6. HAVE YOU EVER DONE ANYTHING, STARTED TO DO ANYTHING, OR PREPARED TO DO ANYTHING TO END YOUR LIFE?: NO
1. IN THE PAST MONTH, HAVE YOU WISHED YOU WERE DEAD OR WISHED YOU COULD GO TO SLEEP AND NOT WAKE UP?: NO

## 2024-01-26 ASSESSMENT — PAIN DESCRIPTION - LOCATION
LOCATION: LEG
LOCATION: KNEE
LOCATION: BACK
LOCATION: LEG

## 2024-01-26 ASSESSMENT — PAIN DESCRIPTION - DESCRIPTORS: DESCRIPTORS: ACHING

## 2024-01-26 ASSESSMENT — PAIN DESCRIPTION - PAIN TYPE: TYPE: ACUTE PAIN

## 2024-01-26 NOTE — ED NOTES
Pt came into the ED due to she said her PCP told her to  They wanted to rule out a clot as she hs swelling and pain on her left knee.  She is currently on blood thinners and has a HX of DVT's       Martha Carson RN  01/26/24 0271

## 2024-01-26 NOTE — ED TRIAGE NOTES
Patient c/o left leg and knee swelling for the past 3 days. Patient has a hx of DVT's and is currently on blood thinners.

## 2024-01-26 NOTE — ED PROVIDER NOTES
HPI   Chief Complaint   Patient presents with    Leg Swelling       78-year-old female with a history of DVTs presents today with left leg swelling and pain.  She rates the pain 3 out of 10.  She she was sent in by her PCP for an ultrasound with concern for return of DVT.  She is on Coumadin but she does not know her last INR.  She denies chest pain.  She denies dyspnea.  She denies fever or chills.  She denies abdominal pain, nausea, or vomiting.  Her  of 15 years is bedside.      History provided by:  Patient   used: No                        Mallory Coma Scale Score: 15                  Patient History   Past Medical History:   Diagnosis Date    Abnormal weight gain 02/25/2014    Weight gain    Acute embolism and thrombosis of unspecified deep veins of unspecified lower extremity (CMS/[facility])     Acute deep vein thrombosis of lower extremity    Alcohol dependence, in remission (CMS/[facility]) 08/09/2013    History of alcoholism    Benign paroxysmal vertigo, unspecified ear 01/26/2017    BPV (benign positional vertigo)    Chronic embolism and thrombosis of unspecified deep veins of unspecified lower extremity (CMS/[facility]) 02/07/2019    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    Dysphagia, unspecified 07/07/2014    Dysphagia    Lumbago with sciatica, right side 07/20/2018    Acute right-sided low back pain with right-sided sciatica    Other chest pain 06/05/2014    Atypical chest pain    Other conditions influencing health status     Alcoholism    Other conditions influencing health status     Pneumonia    Other conditions influencing health status     Closed Rib Fracture    Other specified abnormal findings of blood chemistry 07/03/2014    Elevated liver function tests    Pain in unspecified joint 02/21/2014    Arthralgia    Personal history of other diseases of the circulatory system     History of essential hypertension    Personal history of other diseases of the nervous system  and sense organs 01/26/2017    History of trigeminal neuralgia    Personal history of other diseases of the respiratory system 07/14/2015    History of upper respiratory infection    Personal history of other infectious and parasitic diseases 05/11/2016    History of herpes zoster    Personal history of other mental and behavioral disorders     History of attention deficit hyperactivity disorder    Personal history of other specified conditions 03/27/2015    History of fatigue    Personal history of other specified conditions 07/07/2014    History of abdominal pain     Past Surgical History:   Procedure Laterality Date    BREAST BIOPSY Left     benign    OTHER SURGICAL HISTORY  08/09/2013    Interruption Inferior Vena Cava [place] Filter Placement    OTHER SURGICAL HISTORY  08/09/2013    Nephrectomy    TONSILLECTOMY  08/09/2013    Tonsillectomy     Family History   Problem Relation Name Age of Onset    Stroke Mother      Heart attack Father      Suicidality Other Grandparent      Social History     Tobacco Use    Smoking status: Never    Smokeless tobacco: Never   Substance Use Topics    Alcohol use: Never    Drug use: Never       Physical Exam   ED Triage Vitals [01/26/24 1417]   Temperature Heart Rate Respirations BP   36.4 °C (97.5 °F) 75 18 150/65      Pulse Ox Temp Source Heart Rate Source Patient Position   95 % Tympanic Monitor --      BP Location FiO2 (%)     -- --       Physical Exam  Constitutional:       Appearance: Normal appearance.   HENT:      Head: Normocephalic and atraumatic.      Nose: Nose normal.      Mouth/Throat:      Mouth: Mucous membranes are moist.   Eyes:      Pupils: Pupils are equal, round, and reactive to light.   Cardiovascular:      Rate and Rhythm: Normal rate and regular rhythm.      Pulses: Normal pulses.      Heart sounds: Normal heart sounds.   Pulmonary:      Effort: Pulmonary effort is normal.      Breath sounds: Normal breath sounds.   Abdominal:      General: Abdomen is  flat.      Palpations: Abdomen is soft.   Musculoskeletal:         General: Swelling and tenderness present.      Cervical back: Normal range of motion and neck supple.      Comments: Appreciate a Baker's cyst to the posterior aspect of the left knee   Skin:     General: Skin is warm.      Capillary Refill: Capillary refill takes less than 2 seconds.   Neurological:      General: No focal deficit present.      Mental Status: She is alert and oriented to person, place, and time.   Psychiatric:         Mood and Affect: Mood normal.         Behavior: Behavior normal.         ED Course & MDM   Diagnoses as of 01/26/24 1836   Baker's cyst of knee, left       Medical Decision Making  Ultrasound ordered of left leg.  She received lidocaine patches and acetaminophen for pain.  Basic labs were ordered.  Wells criteria for a DVT is 3.  Vital signs rechecked and stable.  Patient responded well to lidocaine patch and did not appear to be in acute distress.  Ace wrap applied.  She received acetaminophen.  Metabolic panel was normal.  Her INR was therapeutic at 2.4 and she is on Coumadin.  Her CBC showed no leukocytosis or acute anemia.  I appreciate a Sapp's cyst during exam.  She received information on Baker's cyst I requested appointment with orthopedic surgery and gave her contact information.  Her ultrasound of lower extremity was negative for DVT.  The x-ray of her knee showed moderate medial joint compartment narrowing.  Again Ace wrap was applied and she will follow-up with orthopedic surgery.  She will use lidocaine patches to manage her pain.  Safely discharged home with careful return precautions.  Pedal and posterior tibial pulse 2/2.        Procedure  Procedures     Suleman Chavez, APRN-CNP  01/26/24 1836

## 2024-01-30 ENCOUNTER — TREATMENT (OUTPATIENT)
Dept: PHYSICAL THERAPY | Facility: CLINIC | Age: 79
End: 2024-01-30
Payer: MEDICARE

## 2024-01-30 DIAGNOSIS — G89.29 CHRONIC LEFT-SIDED LOW BACK PAIN WITH LEFT-SIDED SCIATICA: ICD-10-CM

## 2024-01-30 DIAGNOSIS — M54.42 CHRONIC LEFT-SIDED LOW BACK PAIN WITH LEFT-SIDED SCIATICA: ICD-10-CM

## 2024-01-30 PROCEDURE — 97110 THERAPEUTIC EXERCISES: CPT | Mod: GP

## 2024-01-30 NOTE — PROGRESS NOTES
"Physical Therapy    Physical Therapy Treatment    Patient Name: Diane Rao  MRN: [id number]  Today's Date: 1/30/24  Time Calculation  Start Time: 1430  Stop Time: 1512  Time Calculation (min): 42 min     Visit #7    Assessment:  Patient is tolerating exercises well with no increase in pain during or after session.  She is very motivated and compliant with exercises.  Negative blood clots left leg, diagnosed with left knee Baker's cyst. Patient will continue to benefit from skilled PT services to address deficits/impairments/patient goals  contributing to limited functional abilities, independence and pain.     Plan:   Continue per POC.  Monitor response to treatment and adjust plan as needed.           Current Problem  1. Chronic left-sided low back pain with left-sided sciatica  Follow Up In Physical Therapy          General  General  Reason for Referral: Chronic left low back pain with LLE sciatica  Referred By: Dr. Keyona aCm  Past Medical History Relevant to Rehab: HTN, neuropathy    Subjective    Patient reports no pain in low back at present.  Went to ED following last visit, ruled out blood clot.  Diagnosed with Baker's cyst in ED.  Went to Dr. Wallace at [facility] yesterday and he prescribed Medrol Dose pack. Hasn't had the numbness in foot.     Precautions  STEADI Fall Risk Score (The score of 4 or more indicates an increased risk of falling): 10  Medical Precautions: No known precautions/limitation     Pain  0/10     Treatments:  Therapeutic Exercise:  Sci Fit x 5 minutes level 1.0 resistance seat 11    Mat exercises: pillow with towel roll for cervical support under head  Hook lying TA bracing with iso Hip adduction with ball between knees 5\" hold x 15 reps  Hook lying TA bracing with B Hip abduction with green theraband 5\" hold x 20 reps  Hook lying Single KTC 5\" hold x 15 reps alt L/R  Posterior Pelvic tilts 5\" hold x 15 reps  Double KTC 5\" hold  x 15 reps  Hook lying slow Marches " "with TA bracing x 20 reps total with green band   Supine Gluteal-sets with large bolster under knees 5\" hold x 15 reps - deferred 1/30/24  Seated Piriformis stretch - figure four x 30 second x 2 times R/L   Isometric crunch pushing into ball on upper leg x 15 (5 second hold) - deferred 1/30/24  Supine hip flexor stretch with leg off edge of table 2 x 30 seconds L/R - deferred 1/30/24  Seated trunk flexion stretch with large swiss ball x 10 - deferred 1/30/24  Seated hamstring stretch 3 x 20\" seconds L/R - deferred 1/30/24  Seated scapular retraction x 15 bilateral - deferred 1/30/24  Supine horizontal abduction shoulders with orange band  1 x 10 reps     OP EDUCATION:  Educated to continue with HEP     Goals:  Active       PT Problem       Pain       Start:  12/26/23    Expected End:  02/28/24       Patient will report decrease in pain level  by >/= 75% in order to perform  ADLs, IADLs,  and leisure/household activities with minimal to no restrictions.          ROM       Start:  12/26/23    Expected End:  02/28/24       Patient will demonstrate increased lumbar AROM to WFL and painfree without compensation to ease the performance of ADLs and functional activities.           Strength       Start:  12/26/23    Expected End:  02/28/24       Patient will increase trunk and LE strength by 1 MMT grade to facilitate endurance for good postural alignment , to provide spinal protection during activity and static positions, and to enhance stamina for standing, walking and functional mobility.          HEP       Start:  12/26/23    Expected End:  02/28/24       Patient will demonstrate independence and compliance with safe and recommended  HEP in order to maximize and maintain functional gains made in physical therapy.          Outcome       Start:  01/03/24    Expected End:  02/28/24       Patient will improve outcome measures score on Modified Oswestry LBP Disability Index by 15%  to show a clinically significant improvement "  in functional mobility.          Posture       Start:  12/26/23    Expected End:  02/28/24       Patient will demonstrate a good working knowledge of proper posture  and self correction in sitting and standing  50% of the time for greater for optimum symptom management/reduction.          Flexibility       Start:  12/26/23    Expected End:  02/28/24       Patient will demonstrate improved  flexibility in B hamstrings to 70 deg and piriformis to good  in order to decrease pain, improve positioning and promote improved functional mobility.

## 2024-02-02 ENCOUNTER — TREATMENT (OUTPATIENT)
Dept: PHYSICAL THERAPY | Facility: CLINIC | Age: 79
End: 2024-02-02
Payer: MEDICARE

## 2024-02-02 DIAGNOSIS — M54.42 CHRONIC LEFT-SIDED LOW BACK PAIN WITH LEFT-SIDED SCIATICA: ICD-10-CM

## 2024-02-02 DIAGNOSIS — G89.29 CHRONIC LEFT-SIDED LOW BACK PAIN WITH LEFT-SIDED SCIATICA: ICD-10-CM

## 2024-02-02 PROCEDURE — 97110 THERAPEUTIC EXERCISES: CPT | Mod: GP

## 2024-02-02 NOTE — PROGRESS NOTES
"Physical Therapy    Physical Therapy Treatment    Patient Name: Diane Rao  MRN: [id number]  Today's Date: 2/2/2024  Time Calculation  Start Time: 1415  Stop Time: 1455  Time Calculation (min): 40 min      Assessment:  Patient tolerated treatment well without increased complaints of pain during or after session. She is progressing well with present program.  Strength and endurance improving as evident by exercise performance and tolerance.   Patient will continue to benefit from skilled PT services to address deficits/impairments/patient goals  contributing to limited functional abilities, independence and pain.     Plan:  Continue per POC, progressing as tolerated.  Progress standing core and BLE strengthening exercises.   Update HEP and provide written instructions.        Current Problem  1. Chronic left-sided low back pain with left-sided sciatica  Follow Up In Physical Therapy             General  General  Reason for Referral: Chronic left low back pain with LLE sciatica  Referred By: Dr. Keyona Cam  Past Medical History Relevant to Rehab: HTN, neuropathy    Subjective    No new complaints.  Denies pain at present.  Reports no increase following last session.     Precautions  STEADI Fall Risk Score (The score of 4 or more indicates an increased risk of falling): 10  Medical Precautions: No known precautions/limitation    Pain  0/10    Treatments:  Therapeutic Exercise:  Sci Fit x 10 minutes level 1.3 resistance seat 11    Mat exercises: pillow with towel roll for cervical support under head  Hook lying TA bracing with iso Hip adduction with ball between knees 5\" hold x 15 reps  Hook lying TA bracing with B Hip abduction with green theraband 5\" hold x 20 reps  Hook lying TA bracing with  unilateral alt R/L hip abduction with green theraband 5\" hold 1 x 10 reps  Hooklying TA bracing with alt R/L bent knee raise with green theraband x 20 reps   Bridges 1 x 10 reps  TA brace with R/L SLR  1 x 10 reps  Side " lying R/L clamshells with green theraband 1 x 10 reps   Side lying R/L hip abduction 1 x 10  Standing B mid rows with green theraband 1 x 15 reps  Standing B shoulder extension with orange theraband 1 x 10 reps      OP EDUCATION:  Educated in correct performance of exercises.  Continue with present HEP as tolerated.        Goals:  Active       PT Problem       Pain       Start:  12/26/23    Expected End:  02/28/24       Patient will report decrease in pain level  by >/= 75% in order to perform  ADLs, IADLs,  and leisure/household activities with minimal to no restrictions.          ROM       Start:  12/26/23    Expected End:  02/28/24       Patient will demonstrate increased lumbar AROM to WFL and painfree without compensation to ease the performance of ADLs and functional activities.           Strength       Start:  12/26/23    Expected End:  02/28/24       Patient will increase trunk and LE strength by 1 MMT grade to facilitate endurance for good postural alignment , to provide spinal protection during activity and static positions, and to enhance stamina for standing, walking and functional mobility.          HEP       Start:  12/26/23    Expected End:  02/28/24       Patient will demonstrate independence and compliance with safe and recommended  HEP in order to maximize and maintain functional gains made in physical therapy.          Outcome       Start:  01/03/24    Expected End:  02/28/24       Patient will improve outcome measures score on Modified Oswestry LBP Disability Index by 15%  to show a clinically significant improvement  in functional mobility.          Posture       Start:  12/26/23    Expected End:  02/28/24       Patient will demonstrate a good working knowledge of proper posture  and self correction in sitting and standing  50% of the time for greater for optimum symptom management/reduction.          Flexibility       Start:  12/26/23    Expected End:  02/28/24       Patient will demonstrate  improved  flexibility in B hamstrings to 70 deg and piriformis to good  in order to decrease pain, improve positioning and promote improved functional mobility.

## 2024-02-05 ENCOUNTER — APPOINTMENT (OUTPATIENT)
Dept: AUDIOLOGY | Facility: CLINIC | Age: 79
End: 2024-02-05
Payer: MEDICARE

## 2024-02-05 ENCOUNTER — APPOINTMENT (OUTPATIENT)
Dept: OTOLARYNGOLOGY | Facility: CLINIC | Age: 79
End: 2024-02-05
Payer: MEDICARE

## 2024-02-06 ENCOUNTER — TREATMENT (OUTPATIENT)
Dept: PHYSICAL THERAPY | Facility: CLINIC | Age: 79
End: 2024-02-06
Payer: MEDICARE

## 2024-02-06 DIAGNOSIS — M54.42 CHRONIC LEFT-SIDED LOW BACK PAIN WITH LEFT-SIDED SCIATICA: ICD-10-CM

## 2024-02-06 DIAGNOSIS — G89.29 CHRONIC LEFT-SIDED LOW BACK PAIN WITH LEFT-SIDED SCIATICA: ICD-10-CM

## 2024-02-06 PROCEDURE — 97110 THERAPEUTIC EXERCISES: CPT | Mod: GP

## 2024-02-06 NOTE — PROGRESS NOTES
"Physical Therapy    Physical Therapy Treatment    Patient Name: Diane Rao  MRN: [id number]  Today's Date: 2/6/2024  Time Calculation  Start Time: 1430  Stop Time: 1513  Time Calculation (min): 43 min      Assessment:  Patient tolerated treatment well without increased complaints of pain during or after session. States the neck and upper back exercises performed today helped pain to centralize to thoracic spine.  Upgraded HEP and provided patient with written instructions and orange theraband. Patient is progressing with present program and reporting increased tolerance for activity over the weekend.      Plan:  Continue per POC, progressing as tolerated. Progress standing core and BLE strengthening exercises.     Current Problem  1. Chronic left-sided low back pain with left-sided sciatica  Follow Up In Physical Therapy          General  General  Reason for Referral: Chronic left low back pain with LLE sciatica  Referred By: Dr. Keyona Cam  Past Medical History Relevant to Rehab: HTN, neuropathy    Subjective    Patient reports some increase in soreness in neck and B shoulders following last session.  Patient also reports 5/10 pain in left shoulder blade area today. Was able to walk at [employer] this weekend without increased pain and increased stamina.     Precautions  STEADI Fall Risk Score (The score of 4 or more indicates an increased risk of falling): 10  Medical Precautions: No known precautions/limitation    Treatments:  Therapeutic Exercises=  Seated cervical retraction 5\" hold 3 x 10 reps  Seated cervical retraction extension 3\" hold 2 x 10 reps  Supine with rolled towel for cervical support-  Supine cervical retraction 5\" hold 1 x 10 reps   Supine B shoulder retraction 5\" hold 1 x 10 reps  Supine B shoulder horizontal ABD with palms up/palms down with orange theraband 2 x 10 reps  Supine B shoulder ER with orange theraband 2 x 10 reps  Supine  R/L UE PNF D2 flexion with orange theraband 2 x 10 " "reps  Hooklying TA brace with B hip ABD with green theraband 2 x 10 reps  Bridges with B hip ABD 2 x 10 reps  Hooklying B hip ADD ball squeeze 5\" hold 1 x 10 reps      OP EDUCATION:  Access Code: [email or web address]  URL: [email or web address]  Date: 02/06/2024  Prepared by: Nadine Britton    Exercises  - Seated Cervical Retraction  - 1 x daily - 7 x weekly - 2 sets - 10 reps  - Seated Cervical Retraction and Extension  - 1 x daily - 7 x weekly - 2 sets - 10 reps  - Supine Cervical Retraction with Towel  - 1 x daily - 7 x weekly - 2 sets - 10 reps  - Supine Shoulder Horizontal Abduction with Resistance  - 1 x daily - 7 x weekly - 2 sets - 10 reps  - Supine Shoulder External Rotation with Resistance  - 1 x daily - 7 x weekly - 2 sets - 10 reps  - Supine PNF D2 Flexion with Resistance  - 1 x daily - 7 x weekly - 2 sets - 10 reps  - Hooklying Clamshell with Resistance  - 1 x daily - 7 x weekly - 2 sets - 10 reps  - Bridge with Hip Abduction and Resistance  - 1 x daily - 7 x weekly - 2 sets - 10 reps  - Supine Hip Adduction Isometric with Ball  - 1 x daily - 7 x weekly - 2 sets - 10 reps       Goals:  Active       PT Problem       Pain       Start:  12/26/23    Expected End:  02/28/24       Patient will report decrease in pain level  by >/= 75% in order to perform  ADLs, IADLs,  and leisure/household activities with minimal to no restrictions.          ROM       Start:  12/26/23    Expected End:  02/28/24       Patient will demonstrate increased lumbar AROM to WFL and painfree without compensation to ease the performance of ADLs and functional activities.           Strength       Start:  12/26/23    Expected End:  02/28/24       Patient will increase trunk and LE strength by 1 MMT grade to facilitate endurance for good postural alignment , to provide spinal protection during activity and static positions, and to enhance stamina for standing, walking and functional mobility.          HEP       Start:  12/26/23 "    Expected End:  02/28/24       Patient will demonstrate independence and compliance with safe and recommended  HEP in order to maximize and maintain functional gains made in physical therapy.          Outcome       Start:  01/03/24    Expected End:  02/28/24       Patient will improve outcome measures score on Modified Oswestry LBP Disability Index by 15%  to show a clinically significant improvement  in functional mobility.          Posture       Start:  12/26/23    Expected End:  02/28/24       Patient will demonstrate a good working knowledge of proper posture  and self correction in sitting and standing  50% of the time for greater for optimum symptom management/reduction.          Flexibility       Start:  12/26/23    Expected End:  02/28/24       Patient will demonstrate improved  flexibility in B hamstrings to 70 deg and piriformis to good  in order to decrease pain, improve positioning and promote improved functional mobility.

## 2024-02-06 NOTE — PROGRESS NOTES
"Physical Therapy    Physical Therapy Treatment    Patient Name: Diane Rao  MRN: [id number]  Today's Date: 2/6/2024         Assessment:     Plan:  Continue per POC, progressing as tolerated. Progress standing core and BLE strengthening exercises. Update HEP and provide written instructions.        Current Problem  1. Chronic left-sided low back pain with left-sided sciatica  Follow Up In Physical Therapy          General          Subjective    Patient reports some increase in soreness in neck and B shoulders following last session.  Patient also reports 5/10 pain in left shoulder blade area today. Was able to walk at [employer] this weekend without increased pain and increased stamina.     Precautions     Vital Signs     Pain       Objective   Cognition     Posture     Extremity/Trunk Assessment  {Extremity/Trunk Assessments:95222}  {Spine,UE,LE,HAND,LYMPHEDEMA:25896}  Activity Tolerance:     Outcome Measures:  {PT Outcome Measures:67682}  Treatments:  Therapeutic Exercises=  Seated cervical retraction 5\" hold 3 x 10 reps  Seated cervical retraction extension 3\" hold 2 x 10 reps  Supine with rolled towel for cervical support-  Supine cervical retraction 5\" hold 1 x 10 reps   Supine B shoulder retraction 5\" hold 1 x 10 reps  Supine B shoulder horizontal ABD with palms up/palms down with orange theraband 2 x 10 reps  Supine B shoulder ER with orange theraband 2 x 10 reps  Supine  R/L UE PNF D2 flexion with orange theraband 2 x 10 reps      OP EDUCATION:  Access Code: [email or web address]  URL: [email or web address]  Date: 02/06/2024  Prepared by: Nadine Britton    Exercises  - Seated Cervical Retraction  - 1 x daily - 7 x weekly - 2 sets - 10 reps  - Seated Cervical Retraction and Extension  - 1 x daily - 7 x weekly - 2 sets - 10 reps  - Supine Cervical Retraction with Towel  - 1 x daily - 7 x weekly - 2 sets - 10 reps  - Supine Shoulder Horizontal Abduction with Resistance  - 1 x daily - 7 x weekly - 2 " sets - 10 reps  - Supine Shoulder External Rotation with Resistance  - 1 x daily - 7 x weekly - 2 sets - 10 reps  - Supine PNF D2 Flexion with Resistance  - 1 x daily - 7 x weekly - 2 sets - 10 reps  - Hooklying Clamshell with Resistance  - 1 x daily - 7 x weekly - 2 sets - 10 reps  - Bridge with Hip Abduction and Resistance  - 1 x daily - 7 x weekly - 2 sets - 10 reps  - Supine Hip Adduction Isometric with Ball  - 1 x daily - 7 x weekly - 2 sets - 10 reps       Goals:  Active       PT Problem       Pain       Start:  12/26/23    Expected End:  02/28/24       Patient will report decrease in pain level  by >/= 75% in order to perform  ADLs, IADLs,  and leisure/household activities with minimal to no restrictions.          ROM       Start:  12/26/23    Expected End:  02/28/24       Patient will demonstrate increased lumbar AROM to WFL and painfree without compensation to ease the performance of ADLs and functional activities.           Strength       Start:  12/26/23    Expected End:  02/28/24       Patient will increase trunk and LE strength by 1 MMT grade to facilitate endurance for good postural alignment , to provide spinal protection during activity and static positions, and to enhance stamina for standing, walking and functional mobility.          HEP       Start:  12/26/23    Expected End:  02/28/24       Patient will demonstrate independence and compliance with safe and recommended  HEP in order to maximize and maintain functional gains made in physical therapy.          Outcome       Start:  01/03/24    Expected End:  02/28/24       Patient will improve outcome measures score on Modified Oswestry LBP Disability Index by 15%  to show a clinically significant improvement  in functional mobility.          Posture       Start:  12/26/23    Expected End:  02/28/24       Patient will demonstrate a good working knowledge of proper posture  and self correction in sitting and standing  50% of the time for greater for  optimum symptom management/reduction.          Flexibility       Start:  12/26/23    Expected End:  02/28/24       Patient will demonstrate improved  flexibility in B hamstrings to 70 deg and piriformis to good  in order to decrease pain, improve positioning and promote improved functional mobility.

## 2024-02-09 ENCOUNTER — APPOINTMENT (OUTPATIENT)
Dept: PHYSICAL THERAPY | Facility: CLINIC | Age: 79
End: 2024-02-09
Payer: MEDICARE

## 2024-02-13 ENCOUNTER — TREATMENT (OUTPATIENT)
Dept: PHYSICAL THERAPY | Facility: CLINIC | Age: 79
End: 2024-02-13
Payer: MEDICARE

## 2024-02-13 DIAGNOSIS — M54.42 CHRONIC LEFT-SIDED LOW BACK PAIN WITH LEFT-SIDED SCIATICA: ICD-10-CM

## 2024-02-13 DIAGNOSIS — G89.29 CHRONIC LEFT-SIDED LOW BACK PAIN WITH LEFT-SIDED SCIATICA: ICD-10-CM

## 2024-02-13 PROCEDURE — 97110 THERAPEUTIC EXERCISES: CPT | Mod: GP

## 2024-02-13 NOTE — PROGRESS NOTES
"Physical Therapy    Physical Therapy Treatment    Patient Name: Diane Rao  MRN: [id number]  Today's Date: 2/13/2024  Time Calculation  Start Time: 1430  Stop Time: 1512  Time Calculation (min): 42 min    Visit 9      Assessment:  Patient tolerated treatment well without increased complaints of pain during or after session and stated less pain in knee at the end of session.   Patient will continue to benefit from skilled PT services to address deficits/impairments/patient goals  contributing to limited functional abilities, independence and pain.        Plan:  Continue per POC, progressing as tolerated. Progress standing core and BLE strengthening exercises. Update HEP and provide written instructions.        Current Problem  1. Chronic left-sided low back pain with left-sided sciatica  Follow Up In Physical Therapy          General  General  Reason for Referral: Chronic left low back pain with LLE sciatica  Referred By: Dr. Keyona Cam  Past Medical History Relevant to Rehab: HTN, neuropathy    Subjective    Overall feeling good except intermittent left knee pain 4/10.  Denies recent falls.     Precautions  STEADI Fall Risk Score (The score of 4 or more indicates an increased risk of falling): 10  Medical Precautions: No known precautions/limitation    Pain  0/10 at present        Treatments:  Therapeutic Exercise:  Sci Fit x 10 minutes level 1.5, resistance seat 11    Standing in // bars with UE support as needed=  B heel/toe raises 2 x 10 reps  R/L hip abduction 2 x 10 reps  Seated B hip ADD ball squeeze 5\" hold 2 x 10 reps  MIP on gray foam 2 x 10 reps  B mid rows with red theratube on gray foam 2 x 10 reps  B shoulder extension with red theratube on gray foam 2 x 10 reps  Side stepping R/L in // bars x 2 laps  FWD step ups R/L, 4\" step 1 x 10 reps  Lateral step dips with opposite hip ABD R/L , 4\" step 1 x 10 reps   Marching with holds in // bars x 2 laps     Deferred the following 2/13/24=  Mat " "exercises: pillow with towel roll for cervical support under head  Hook lying TA bracing with iso Hip adduction with ball between knees 5\" hold x 15 reps  Hook lying TA bracing with B Hip abduction with green theraband 5\" hold x 20 reps  Hook lying TA bracing with  unilateral alt R/L hip abduction with green theraband 5\" hold 1 x 10 reps  Hooklying TA bracing with alt R/L bent knee raise with green theraband x 20 reps   Bridges 1 x 10 reps  TA brace with R/L SLR  1 x 10 reps  Side lying R/L clamshells with green theraband 1 x 10 reps   Side lying R/L hip abduction 1 x 10    OP EDUCATION:  Educated in correct performance of exercises.  Continue with present HEP as tolerated.      Goals:  Active       PT Problem       Pain       Start:  12/26/23    Expected End:  02/28/24       Patient will report decrease in pain level  by >/= 75% in order to perform  ADLs, IADLs,  and leisure/household activities with minimal to no restrictions.          ROM       Start:  12/26/23    Expected End:  02/28/24       Patient will demonstrate increased lumbar AROM to WFL and painfree without compensation to ease the performance of ADLs and functional activities.           Strength       Start:  12/26/23    Expected End:  02/28/24       Patient will increase trunk and LE strength by 1 MMT grade to facilitate endurance for good postural alignment , to provide spinal protection during activity and static positions, and to enhance stamina for standing, walking and functional mobility.          HEP       Start:  12/26/23    Expected End:  02/28/24       Patient will demonstrate independence and compliance with safe and recommended  HEP in order to maximize and maintain functional gains made in physical therapy.          Outcome       Start:  01/03/24    Expected End:  02/28/24       Patient will improve outcome measures score on Modified Oswestry LBP Disability Index by 15%  to show a clinically significant improvement  in functional " mobility.          Posture       Start:  12/26/23    Expected End:  02/28/24       Patient will demonstrate a good working knowledge of proper posture  and self correction in sitting and standing  50% of the time for greater for optimum symptom management/reduction.          Flexibility       Start:  12/26/23    Expected End:  02/28/24       Patient will demonstrate improved  flexibility in B hamstrings to 70 deg and piriformis to good  in order to decrease pain, improve positioning and promote improved functional mobility.

## 2024-02-16 ENCOUNTER — TREATMENT (OUTPATIENT)
Dept: PHYSICAL THERAPY | Facility: CLINIC | Age: 79
End: 2024-02-16
Payer: MEDICARE

## 2024-02-16 DIAGNOSIS — G89.29 CHRONIC LEFT-SIDED LOW BACK PAIN WITH LEFT-SIDED SCIATICA: ICD-10-CM

## 2024-02-16 DIAGNOSIS — M54.42 CHRONIC LEFT-SIDED LOW BACK PAIN WITH LEFT-SIDED SCIATICA: ICD-10-CM

## 2024-02-16 PROCEDURE — 97110 THERAPEUTIC EXERCISES: CPT | Mod: KX,GP

## 2024-02-16 NOTE — PROGRESS NOTES
"Physical Therapy    Physical Therapy Treatment    Patient Name: Diane Rao  MRN: [id number]  Today's Date: 2/16/2024  Time Calculation  Start Time: 1415  Stop Time: 1455  Time Calculation (min): 40 min    Visit 10      Assessment:  Patient tolerated treatment well without increased complaints of pain during or after session.  Fatigued at the end of session, but able to perform exercises with good effort and technique. Patient is progressing well with present program.  Patient will continue to benefit from skilled PT services to address deficits/impairments/patient goals  contributing to limited functional abilities, independence and pain.        Plan:  Continue per POC, progressing as tolerated. Progress standing core and BLE strengthening exercises. Update HEP and provide written instructions.         Current Problem  1. Chronic left-sided low back pain with left-sided sciatica  Follow Up In Physical Therapy          General  General  Reason for Referral: Chronic left low back pain with LLE sciatica  Referred By: Dr. Keyona Cam  Past Medical History Relevant to Rehab: HTN, neuropathy    Subjective   Had an episode of increased back and left foot pain yesterday after doing some walking in a store.  Resolved after sitting.  No pain at present.  No new complaints.  Denies recent falls.        Precautions  STEADI Fall Risk Score (The score of 4 or more indicates an increased risk of falling): 10  Medical Precautions: No known precautions/limitation       Pain  0/10    Treatments:  Therapeutic Exercise: 42 mins  Mat exercises: pillow with towel roll for cervical support under head  Hooklying TA bracing with deep breathing 5\" hold x 10 reps  Hook lying TA bracing with iso Hip adduction with ball between knees 5\" hold x 10 reps  Hook lying TA bracing with B Hip abduction with green theraband 5\" hold x 20 reps  Hook lying TA bracing with  unilateral alt R/L hip abduction with green theraband 5\" hold 2 x 10 " "reps  Hooklying TA bracing with alt R/L bent knee raise with green theraband x 20 reps   Bridges  with green theraband around thigh 1 x 10 reps  Piriformis stretch R/L 3 x 20\"  Side lying R/L clamshells with green theraband 2 x 10 reps   Side lying R/L hip abduction 2  x 10 reps    Sci Fit x 10 minutes level 1.5, resistance seat 11 - deferred 2/16/24    Standing in // bars with UE support as needed=  B heel/toe raises 2 x 10 reps  R/L hip abduction 2 x 10 reps    Deferred the following due to time constraints 2/16/24:  Seated B hip ADD ball squeeze 5\" hold 2 x 10 reps  MIP on gray foam 2 x 10 reps  B mid rows with red theratube on gray foam 2 x 10 reps  B shoulder extension with red theratube on gray foam 2 x 10 reps  Side stepping R/L in // bars x 2 laps  FWD step ups R/L, 4\" step 1 x 10 reps  Lateral step dips with opposite hip ABD R/L , 4\" step 1 x 10 reps   Marching with holds in // bars x 2 laps        OP EDUCATION:  Educated in correct performance of exercises.  Continue with present HEP as tolerated.   Access Code: [email or web address]  URL: [email or web address]  Date: 02/06/2024  Prepared by: Nadine Britton     Exercises  - Seated Cervical Retraction  - 1 x daily - 7 x weekly - 2 sets - 10 reps  - Seated Cervical Retraction and Extension  - 1 x daily - 7 x weekly - 2 sets - 10 reps  - Supine Cervical Retraction with Towel  - 1 x daily - 7 x weekly - 2 sets - 10 reps  - Supine Shoulder Horizontal Abduction with Resistance  - 1 x daily - 7 x weekly - 2 sets - 10 reps  - Supine Shoulder External Rotation with Resistance  - 1 x daily - 7 x weekly - 2 sets - 10 reps  - Supine PNF D2 Flexion with Resistance  - 1 x daily - 7 x weekly - 2 sets - 10 reps  - Hooklying Clamshell with Resistance  - 1 x daily - 7 x weekly - 2 sets - 10 reps  - Bridge with Hip Abduction and Resistance  - 1 x daily - 7 x weekly - 2 sets - 10 reps  - Supine Hip Adduction Isometric with Ball  - 1 x daily - 7 x weekly - 2 " sets - 10 rep          Goals:  Active       PT Problem       Pain       Start:  12/26/23    Expected End:  02/28/24       Patient will report decrease in pain level  by >/= 75% in order to perform  ADLs, IADLs,  and leisure/household activities with minimal to no restrictions.          ROM       Start:  12/26/23    Expected End:  02/28/24       Patient will demonstrate increased lumbar AROM to WFL and painfree without compensation to ease the performance of ADLs and functional activities.           Strength       Start:  12/26/23    Expected End:  02/28/24       Patient will increase trunk and LE strength by 1 MMT grade to facilitate endurance for good postural alignment , to provide spinal protection during activity and static positions, and to enhance stamina for standing, walking and functional mobility.          HEP       Start:  12/26/23    Expected End:  02/28/24       Patient will demonstrate independence and compliance with safe and recommended  HEP in order to maximize and maintain functional gains made in physical therapy.          Outcome       Start:  01/03/24    Expected End:  02/28/24       Patient will improve outcome measures score on Modified Oswestry LBP Disability Index by 15%  to show a clinically significant improvement  in functional mobility.          Posture       Start:  12/26/23    Expected End:  02/28/24       Patient will demonstrate a good working knowledge of proper posture  and self correction in sitting and standing  50% of the time for greater for optimum symptom management/reduction.          Flexibility       Start:  12/26/23    Expected End:  02/28/24       Patient will demonstrate improved  flexibility in B hamstrings to 70 deg and piriformis to good  in order to decrease pain, improve positioning and promote improved functional mobility.

## 2024-02-20 ENCOUNTER — TREATMENT (OUTPATIENT)
Dept: PHYSICAL THERAPY | Facility: CLINIC | Age: 79
End: 2024-02-20
Payer: MEDICARE

## 2024-02-20 DIAGNOSIS — G89.29 CHRONIC LEFT-SIDED LOW BACK PAIN WITH LEFT-SIDED SCIATICA: ICD-10-CM

## 2024-02-20 DIAGNOSIS — M54.42 CHRONIC LEFT-SIDED LOW BACK PAIN WITH LEFT-SIDED SCIATICA: ICD-10-CM

## 2024-02-20 PROCEDURE — 97110 THERAPEUTIC EXERCISES: CPT | Mod: KX,GP

## 2024-02-20 NOTE — PROGRESS NOTES
Physical Therapy    Physical Therapy Treatment    Patient Name: Diane Rao  MRN: [id number]  Today's Date: 2/20/2024  Time Calculation  Start Time: 1430  Stop Time: 1510  Time Calculation (min): 40 min    Visit 11      Assessment:  Patient tolerated treatment well without increased complaints of pain during or after session.  Fatigued at the end of session, but able to perform exercises with good effort and technique.  Patient was also able to tolerate the addition of B 2# ankle weights to nearly all the exercises.  Denies pain at the end of session and numbness in foot is gone. Patient is progressing well with present program.  Patient will continue to benefit from skilled PT services to address deficits/impairments/patient goals  contributing to limited functional abilities, independence and pain.      Plan:  Re-eval next visit.   Continue per POC, progressing as tolerated. Progress standing core and BLE strengthening exercises. Update HEP and provide written instructions.          Current Problem  1. Chronic left-sided low back pain with left-sided sciatica  Follow Up In Physical Therapy           General  General  Reason for Referral: Chronic left low back pain with LLE sciatica  Referred By: Dr. Keyona Cam  Past Medical History Relevant to Rehab: HTN, neuropathy    Subjective    Patient reports numbness in left foot.  States it happened after doing exercises yesterday.  Better today.  Denies pain or recent falls.     Precautions  STEADI Fall Risk Score (The score of 4 or more indicates an increased risk of falling): 10  Medical Precautions: No known precautions/limitation    Vitals  96 -97 SpO2%  79 bpm          Pain  0/10    Treatments:  Therapeutic Exercise:   mins  Recumbent Sci Fit x 10 minutes level 1.5, resistance seat 11     Standing in // bars with UE support as needed=2# B ankle weights except as noted  B heel/toe raises 2 x 10 reps - no wts  R/L hip abduction 2 x 10 reps  MIP on gray foam 2 x  "10 reps  Side stepping R/L in // bars x 2 laps  Seated B hip ADD ball squeeze 5\" hold 2 x 10 reps   B mid rows with red theratube on gray foam 2 x 10 reps - no support, SBA  B shoulder extension with red theratube on gray foam 2 x 10 reps - no support, SBA  STS from armchair without use of UE  x 10 reps  Seated R/L SAQs with ball squeeze R 2 x 5 reps, L 1 x 10 and 1 x 5  reps  FWD step ups R/L, 4\" step 1 x 10 reps  Lateral step up and overs R/L 4\" block 1 x 10 reps  Marching with holds in // bars x 2 laps - deferred 2/20/24    Deferred the following this date 2/20/24=  Mat exercises: pillow with towel roll for cervical support under head  Hooklying TA bracing with deep breathing 5\" hold x 10 reps  Hook lying TA bracing with iso Hip adduction with ball between knees 5\" hold x 10 reps  Hook lying TA bracing with B Hip abduction with green theraband 5\" hold x 20 reps  Hook lying TA bracing with  unilateral alt R/L hip abduction with green theraband 5\" hold 2 x 10 reps  Hooklying TA bracing with alt R/L bent knee raise with green theraband x 20 reps   Bridges  with green theraband around thigh 1 x 10 reps  Piriformis stretch R/L 3 x 20\"  Side lying R/L clamshells with green theraband 2 x 10 reps   Side lying R/L hip abduction 2  x 10 reps         OP EDUCATION:  Educated in correct performance of exercises.  Continue with present HEP as tolerated.   Access Code: [email or web address]  URL: [email or web address]  Date: 02/06/2024  Prepared by: Nadine Britton     Exercises  - Seated Cervical Retraction  - 1 x daily - 7 x weekly - 2 sets - 10 reps  - Seated Cervical Retraction and Extension  - 1 x daily - 7 x weekly - 2 sets - 10 reps  - Supine Cervical Retraction with Towel  - 1 x daily - 7 x weekly - 2 sets - 10 reps  - Supine Shoulder Horizontal Abduction with Resistance  - 1 x daily - 7 x weekly - 2 sets - 10 reps  - Supine Shoulder External Rotation with Resistance  - 1 x daily - 7 x weekly - 2 sets " - 10 reps  - Supine PNF D2 Flexion with Resistance  - 1 x daily - 7 x weekly - 2 sets - 10 reps  - Hooklying Clamshell with Resistance  - 1 x daily - 7 x weekly - 2 sets - 10 reps  - Bridge with Hip Abduction and Resistance  - 1 x daily - 7 x weekly - 2 sets - 10 reps  - Supine Hip Adduction Isometric with Ball  - 1 x daily - 7 x weekly - 2 sets - 10 rep        Goals:  Active         PT Problem         Pain         Start:  12/26/23    Expected End:  02/28/24        Patient will report decrease in pain level  by >/= 75% in order to perform  ADLs, IADLs,  and leisure/household activities with minimal to no restrictions.            ROM         Start:  12/26/23    Expected End:  02/28/24        Patient will demonstrate increased lumbar AROM to WFL and painfree without compensation to ease the performance of ADLs and functional activities.             Strength         Start:  12/26/23    Expected End:  02/28/24        Patient will increase trunk and LE strength by 1 MMT grade to facilitate endurance for good postural alignment , to provide spinal protection during activity and static positions, and to enhance stamina for standing, walking and functional mobility.            HEP         Start:  12/26/23    Expected End:  02/28/24        Patient will demonstrate independence and compliance with safe and recommended  HEP in order to maximize and maintain functional gains made in physical therapy.            Outcome         Start:  01/03/24    Expected End:  02/28/24        Patient will improve outcome measures score on Modified Oswestry LBP Disability Index by 15%  to show a clinically significant improvement  in functional mobility.            Posture         Start:  12/26/23    Expected End:  02/28/24        Patient will demonstrate a good working knowledge of proper posture  and self correction in sitting and standing  50% of the time for greater for optimum symptom management/reduction.            Flexibility          Start:  12/26/23    Expected End:  02/28/24        Patient will demonstrate improved  flexibility in B hamstrings to 70 deg and piriformis to good  in order to decrease pain, improve positioning and promote improved functional mobility.                      Electronically signed by Nadine Britton PT at 2/16/2024  3:04 PM       Goals:  Active       PT Problem       Pain       Start:  12/26/23    Expected End:  02/28/24       Patient will report decrease in pain level  by >/= 75% in order to perform  ADLs, IADLs,  and leisure/household activities with minimal to no restrictions.          ROM       Start:  12/26/23    Expected End:  02/28/24       Patient will demonstrate increased lumbar AROM to WFL and painfree without compensation to ease the performance of ADLs and functional activities.           Strength       Start:  12/26/23    Expected End:  02/28/24       Patient will increase trunk and LE strength by 1 MMT grade to facilitate endurance for good postural alignment , to provide spinal protection during activity and static positions, and to enhance stamina for standing, walking and functional mobility.          HEP       Start:  12/26/23    Expected End:  02/28/24       Patient will demonstrate independence and compliance with safe and recommended  HEP in order to maximize and maintain functional gains made in physical therapy.          Outcome       Start:  01/03/24    Expected End:  02/28/24       Patient will improve outcome measures score on Modified Oswestry LBP Disability Index by 15%  to show a clinically significant improvement  in functional mobility.          Posture       Start:  12/26/23    Expected End:  02/28/24       Patient will demonstrate a good working knowledge of proper posture  and self correction in sitting and standing  50% of the time for greater for optimum symptom management/reduction.          Flexibility       Start:  12/26/23    Expected End:  02/28/24       Patient will  demonstrate improved  flexibility in B hamstrings to 70 deg and piriformis to good  in order to decrease pain, improve positioning and promote improved functional mobility.

## 2024-02-23 ENCOUNTER — TREATMENT (OUTPATIENT)
Dept: PHYSICAL THERAPY | Facility: CLINIC | Age: 79
End: 2024-02-23
Payer: MEDICARE

## 2024-02-23 DIAGNOSIS — G89.29 CHRONIC LEFT-SIDED LOW BACK PAIN WITH LEFT-SIDED SCIATICA: Primary | ICD-10-CM

## 2024-02-23 DIAGNOSIS — M54.42 CHRONIC LEFT-SIDED LOW BACK PAIN WITH LEFT-SIDED SCIATICA: Primary | ICD-10-CM

## 2024-02-23 PROCEDURE — 97530 THERAPEUTIC ACTIVITIES: CPT | Mod: KX,GP

## 2024-02-23 NOTE — PROGRESS NOTES
Physical Therapy    Physical Therapy Treatment/PT Re-check     Patient Name: Diane Rao  MRN: [id number]  Today's Date: 2/23/2024  Time Calculation  Start Time: 1345  Stop Time: 1425  Time Calculation (min): 40 min    Visit 12      Assessment:  Patient is a pleasant 78 yr old woman referred to PT for chronic LBP with radicular LLE pain with prolonged standing and walking.  She is making good progress in PT.  She reports the LLE pain is no longer present and she is able to walk farther before onset of pain, including walking in the park for leisure.  She also demonstrates improved postural alignment, increased lumbar AROM, increased LE strength, increased BLE flexibility and increased stamina.  She continues to exhibit core and proximal hip weakness which contributes to persistent LBP.   Patient will likely benefit from continued skilled PT services to address unmet goals to maximize gains in functional mobility, pain reduction and return to active lifestyle.      Plan:  Continue with skilled PT services 2 x/wk x 4 weeks per POC.   Further assess balance as needed.  Upgrade HEP next visit. Progress DLS and core/hip strengthening and stabilization exercises.        Current Problem  1. Chronic left-sided low back pain with left-sided sciatica  Follow Up In Physical Therapy    Follow Up In Physical Therapy          General  General  Reason for Referral: Chronic left low back pain with LLE sciatica  Referred By: Dr. Keyona Cam  Past Medical History Relevant to Rehab: HTN, neuropathy       Subjective    Patient reports she walked .3 mile at the park this week.  Sitting tolerance for sitting in straight back chair is improved.  Has more energy and stamina for activity. Walking more.  I can get in the car easier. Not having as much  bladder leaking as before.  Has constant nagging pain in LB, but no longer has radiating leg pain. Does have intermittent knee pain in left.  Pain rated 0/10 at present.  Worst at  6-7/10.  Overall,  reports 25% better. States she was able to do the bridge with hip ABD and band yesterday x 10 reps.     Precautions  STEADI Fall Risk Score (The score of 4 or more indicates an increased risk of falling): 10  Medical Precautions: No known precautions/limitation     Objective   Posture: Improved postural awareness in sitting and standing positions.   Good sitting posture.  Much improved  standing posture - no forward flexed trunk,  no trunk rotation,  only slight right trunk shift, equal weight bearing BLEs , level pelvis and PSIS.     Lumbar AROM  Lumbar spine movement Loss -   Flexion: minimal  loss, improved  reversal of curve, no pain  Extension: min-mod  loss, no pain   R SGIS:  (hips R) min loss, no pain  L SGIS: (hips L) no loss, no pain     Repeated Movement Testing -    Repeated flexion in standing x 10 reps, no pain and no vertigo  Repeated extension in standing x 10 reps, no pain and no deviation, pulling sensation during but no worse  Repeated flexion in lying - NT  Repeated extension in lying - NT     ROM  ROM right LE WFL no pain,   ROM left hip WFL except ER 75% and IR 50%      Flexibility  Hamstrings L 68 deg min painful, R 70 deg no pain  Gastrocs R/L fair  Piriformis L fair painful, R fair painful     Strength  Hip flexion sitting R 4+/5, L 4+/5  Hip flexion supine R 4-/5 mod pain hip, L 4-/5  Hip abduction R 3+/5  mod pain hip , L 4-/5 no pain  Bridge 75% no pain  Knee extension R/L 5/5  Knee flexion R/L 4/5  Ankle DF R/L 5/5  Ankle PF R/L 5/5  Abdominals poor  Dynamic Lumbar stabilization poor  Postural endurance poor     Outcome Measures  Modified Oswestry Low Back Pain Disability Index score 23, 46% disabled (improved from score 27, 54%  disabled)     Treatments:  Therapeutic Activity= 40 mins  Completed Re-check, see subjective and objective sections for details.     OP EDUCATION:  Access Code: [email or web address]  URL: [email or web address]       Goals:  Active        PT Problem       Pain (Progressing)       Start:  12/26/23    Expected End:  02/28/24       Patient will report decrease in pain level  by >/= 75% in order to perform  ADLs, IADLs,  and leisure/household activities with minimal to no restrictions.       Goal Note       Reports 25% improvement.              ROM (Progressing)       Start:  12/26/23    Expected End:  02/28/24       Patient will demonstrate increased lumbar AROM to WFL and painfree without compensation to ease the performance of ADLs and functional activities.        Goal Note       Lumbar spine movement Loss -   Flexion: minimal  loss, improved  reversal of curve, no pain  Extension: min-mod  loss, no pain   R SGIS:  (hips R) min loss, no pain  L SGIS: (hips L) no loss, no pain              Strength (Progressing)       Start:  12/26/23    Expected End:  02/28/24       Patient will increase trunk and LE strength by 1 MMT grade to facilitate endurance for good postural alignment , to provide spinal protection during activity and static positions, and to enhance stamina for standing, walking and functional mobility.       Goal Note       See objective section for details.              HEP (Progressing)       Start:  12/26/23    Expected End:  02/28/24       Patient will demonstrate independence and compliance with safe and recommended  HEP in order to maximize and maintain functional gains made in physical therapy.       Goal Note       ongoing              Outcome (Progressing)       Start:  01/03/24    Expected End:  02/28/24       Patient will improve outcome measures score on Modified Oswestry LBP Disability Index by 15%  to show a clinically significant improvement  in functional mobility.       Goal Note       Improved by 8%              Posture (Met)       Start:  12/26/23    Expected End:  02/28/24    Resolved:  02/23/24    Patient will demonstrate a good working knowledge of proper posture  and self correction in sitting and standing  50% of  the time for greater for optimum symptom management/reduction.       Goal Note       See objective section for details.              Flexibility (Progressing)       Start:  12/26/23    Expected End:  02/28/24       Patient will demonstrate improved  flexibility in B hamstrings to 70 deg and piriformis to good  in order to decrease pain, improve positioning and promote improved functional mobility.       Goal Note       Hamstrings L 68 deg min painful, R 70 deg no pain  Gastrocs R/L fair  Piriformis L fair painful, R fair painful

## 2024-03-02 DIAGNOSIS — I10 ESSENTIAL HYPERTENSION: ICD-10-CM

## 2024-03-02 DIAGNOSIS — I82.519 CHRONIC DEEP VEIN THROMBOSIS (DVT) OF FEMORAL VEIN, UNSPECIFIED LATERALITY (MULTI): ICD-10-CM

## 2024-03-02 DIAGNOSIS — E78.00 PURE HYPERCHOLESTEROLEMIA: ICD-10-CM

## 2024-03-04 RX ORDER — METOPROLOL TARTRATE 25 MG/1
25 TABLET, FILM COATED ORAL 2 TIMES DAILY
Qty: 180 TABLET | Refills: 1 | Status: SHIPPED | OUTPATIENT
Start: 2024-03-04

## 2024-03-05 ENCOUNTER — TREATMENT (OUTPATIENT)
Dept: PHYSICAL THERAPY | Facility: CLINIC | Age: 79
End: 2024-03-05
Payer: MEDICARE

## 2024-03-05 DIAGNOSIS — G89.29 CHRONIC LEFT-SIDED LOW BACK PAIN WITH LEFT-SIDED SCIATICA: ICD-10-CM

## 2024-03-05 DIAGNOSIS — M54.42 CHRONIC LEFT-SIDED LOW BACK PAIN WITH LEFT-SIDED SCIATICA: ICD-10-CM

## 2024-03-05 PROCEDURE — 97110 THERAPEUTIC EXERCISES: CPT | Mod: GP,KX,CQ

## 2024-03-05 NOTE — PROGRESS NOTES
"Physical Therapy    Physical Therapy Treatment  Patient Name: Diane Rao  MRN: [id number]  Today's Date: 3/5/2024  Time Calculation  Start Time: 1600  Stop Time: 1655  Time Calculation (min): 55 min    General  Onset date 12/26/2023  Visit 13      Assessment:  Pt voiced no complaints of pain of LE or back , did report neck pain that she felt unable to work on her home exercises for last few days . Discussed at length method to gradually re introduce upper body exercise. Addition of standing LE exercise to program today . Pt performed good return demo of exercises completed . Will benefit from continued skilled PT services to address unmet goals to maximize gains in functional mobility, pain reduction and return to active lifestyle.      Plan:  Continue with skilled PT services 2 x/wk x 4 weeks per POC.   Further assess balance as needed.  Progress DLS and core/hip strengthening and stabilization exercises.        Current Problem  1. Chronic left-sided low back pain with left-sided sciatica  Follow Up In Physical Therapy        Pain  Neck pain not quantified with # rating  No back/LE pain       Subjective    Patient reports she walked .1 mile at the park this week. Noting increased neck soreness so did not work on exercises .   Ready to begin again and will introduce slowly back starting with cervical retraction supine and UE horizontal AB/Adduction . Will discontinue cervical retraction with extension because it feels crunchy and uncomfortable.    Precautions  STEADI Fall Risk Score (The score of 4 or more indicates an increased risk of falling): 10  Medical Precautions: No known precautions/limitation     Objective   Treatment   Therapeutic Exercise 50 minutes  Sci fit level 1 seat 11 x 10 minutes  Mat exer  SAQ with ball squeeze between lower legs 3\" holds 2# ankle cuffs     // Bars 2 hand support 2# ankle cuffs  B heel/toe raises 20 reps   R/L hip abduction 20 reps  MIP 20 reps  Ham curls 20 reps alternating "    R/L Hip extensions 20 reps  Mini squats 20reps    OP EDUCATION:  Upgraded HEP with standing LE program today , issued corresponding handout       Goals:  Active       PT Problem       Pain (Progressing)       Start:  12/26/23    Expected End:  02/28/24       Patient will report decrease in pain level  by >/= 75% in order to perform  ADLs, IADLs,  and leisure/household activities with minimal to no restrictions.       Goal Note       Reports 25% improvement.              ROM (Progressing)       Start:  12/26/23    Expected End:  02/28/24       Patient will demonstrate increased lumbar AROM to WFL and painfree without compensation to ease the performance of ADLs and functional activities.        Goal Note       Lumbar spine movement Loss -   Flexion: minimal  loss, improved  reversal of curve, no pain  Extension: min-mod  loss, no pain   R SGIS:  (hips R) min loss, no pain  L SGIS: (hips L) no loss, no pain              Strength (Progressing)       Start:  12/26/23    Expected End:  02/28/24       Patient will increase trunk and LE strength by 1 MMT grade to facilitate endurance for good postural alignment , to provide spinal protection during activity and static positions, and to enhance stamina for standing, walking and functional mobility.       Goal Note       See objective section for details.              HEP (Progressing)       Start:  12/26/23    Expected End:  02/28/24       Patient will demonstrate independence and compliance with safe and recommended  HEP in order to maximize and maintain functional gains made in physical therapy.       Goal Note       ongoing              Outcome (Progressing)       Start:  01/03/24    Expected End:  02/28/24       Patient will improve outcome measures score on Modified Oswestry LBP Disability Index by 15%  to show a clinically significant improvement  in functional mobility.       Goal Note       Improved by 8%              Posture (Met)       Start:  12/26/23     Expected End:  02/28/24    Resolved:  02/23/24    Patient will demonstrate a good working knowledge of proper posture  and self correction in sitting and standing  50% of the time for greater for optimum symptom management/reduction.       Goal Note       See objective section for details.              Flexibility (Progressing)       Start:  12/26/23    Expected End:  02/28/24       Patient will demonstrate improved  flexibility in B hamstrings to 70 deg and piriformis to good  in order to decrease pain, improve positioning and promote improved functional mobility.       Goal Note       Hamstrings L 68 deg min painful, R 70 deg no pain  Gastrocs R/L fair  Piriformis L fair painful, R fair painful

## 2024-03-11 ENCOUNTER — TREATMENT (OUTPATIENT)
Dept: PHYSICAL THERAPY | Facility: CLINIC | Age: 79
End: 2024-03-11
Payer: MEDICARE

## 2024-03-11 DIAGNOSIS — M54.42 CHRONIC LEFT-SIDED LOW BACK PAIN WITH LEFT-SIDED SCIATICA: ICD-10-CM

## 2024-03-11 DIAGNOSIS — G89.29 CHRONIC LEFT-SIDED LOW BACK PAIN WITH LEFT-SIDED SCIATICA: ICD-10-CM

## 2024-03-11 PROCEDURE — 97110 THERAPEUTIC EXERCISES: CPT | Mod: KX,GP

## 2024-03-11 NOTE — PROGRESS NOTES
"Physical Therapy    Physical Therapy Treatment    Patient Name: Diane Rao  MRN: [id number]  Today's Date: 3/11/2024  Time Calculation  Start Time: 1328  Stop Time: 1410  Time Calculation (min): 42 min      Assessment:  Patient  progressing with present program.  She tolerated treatment well without reported increased complaints of lasting pain during or after exercises ,  but fatigued at the end of session.  Required several seated rest breaks and recovered appropriately.  Challenged by SLS R/L . Patient demonstrated good effort and good form with exercises today.   Will benefit from continued skilled PT services to address unmet goals to maximize gains in functional mobility, pain reduction and return to active lifestyle.     Plan:   Progress DLS and core/hip strengthening and stabilization exercises, balance activities.        Current Problem  1. Chronic left-sided low back pain with left-sided sciatica        General  Onset date 12/26/2023  Visit 14      Subjective    Patient reports no significant pain but feels some soreness intermittently in knees and shoulders.  Lumbar area usually hurts when she gets up in the morning. Does the exercises and this helps  with decreasing the pain.  States she is \" definitely improving, but not completely there\".  States she is more aware of her posture.   Denies recent falls.     Precautions  STEADI Fall Risk Score (The score of 4 or more indicates an increased risk of falling): 10  Medical Precautions: No known precautions/limitation    Vitals  97 SpO2%  76 bpm   Towards the end of session.     Pain  0/10     Objective  SLS R/L 5-7\"     Treatments:  Treatment   Therapeutic Exercise 50 minutes  Sci fit level 1 seat 11, level 1.5  x 10 minutes    Standing  B mid rows with green theratube on air ex foam 1 x 20 reps  B shoulder extension with green theratube on air ex foam 1 x 20 reps  Alt R/L shoulder extension with green theratube on air ex foam 1 x   Mat exer   // Bars 2 " "hand support 2.5# ankle cuffs  B heel/toe raises 1 x 20 reps   R/L hip abduction 2 x 10 reps  Seated R/L LAQs with ball squeeze with 2.5 # ankle wts 1 x 10 reps   STS from std chair without UE use 1 x 10 reps  MIP on air ex foam with 2.5# ankle wts 2 x 10 reps with minimal use of UE  Side stepping R/L along // bars without UE assist x 3 laps, 2.5# ankle wts.  R/L SLS 3 x 20\" with intermittent UE support    Deferred the following 3/11/24=  Ham curls 20 reps alternating    R/L Hip extensions 20 reps  Mini squats 20reps    OP EDUCATION:  Upgraded HEP with standing LE program, issued corresponding handout        Goals:  Active       PT Problem       Pain (Progressing)       Start:  12/26/23    Expected End:  02/28/24       Patient will report decrease in pain level  by >/= 75% in order to perform  ADLs, IADLs,  and leisure/household activities with minimal to no restrictions.       Goal Note       Reports 25% improvement.              ROM (Progressing)       Start:  12/26/23    Expected End:  02/28/24       Patient will demonstrate increased lumbar AROM to WFL and painfree without compensation to ease the performance of ADLs and functional activities.        Goal Note       Lumbar spine movement Loss -   Flexion: minimal  loss, improved  reversal of curve, no pain  Extension: min-mod  loss, no pain   R SGIS:  (hips R) min loss, no pain  L SGIS: (hips L) no loss, no pain              Strength (Progressing)       Start:  12/26/23    Expected End:  02/28/24       Patient will increase trunk and LE strength by 1 MMT grade to facilitate endurance for good postural alignment , to provide spinal protection during activity and static positions, and to enhance stamina for standing, walking and functional mobility.       Goal Note       See objective section for details.              HEP (Progressing)       Start:  12/26/23    Expected End:  02/28/24       Patient will demonstrate independence and compliance with safe and " recommended  HEP in order to maximize and maintain functional gains made in physical therapy.       Goal Note       ongoing              Outcome (Progressing)       Start:  01/03/24    Expected End:  02/28/24       Patient will improve outcome measures score on Modified Oswestry LBP Disability Index by 15%  to show a clinically significant improvement  in functional mobility.       Goal Note       Improved by 8%              Posture (Met)       Start:  12/26/23    Expected End:  02/28/24    Resolved:  02/23/24    Patient will demonstrate a good working knowledge of proper posture  and self correction in sitting and standing  50% of the time for greater for optimum symptom management/reduction.       Goal Note       See objective section for details.              Flexibility (Progressing)       Start:  12/26/23    Expected End:  02/28/24       Patient will demonstrate improved  flexibility in B hamstrings to 70 deg and piriformis to good  in order to decrease pain, improve positioning and promote improved functional mobility.       Goal Note       Hamstrings L 68 deg min painful, R 70 deg no pain  Gastrocs R/L fair  Piriformis L fair painful, R fair painful

## 2024-03-13 ENCOUNTER — TREATMENT (OUTPATIENT)
Dept: PHYSICAL THERAPY | Facility: CLINIC | Age: 79
End: 2024-03-13
Payer: MEDICARE

## 2024-03-13 DIAGNOSIS — G89.29 CHRONIC LEFT-SIDED LOW BACK PAIN WITH LEFT-SIDED SCIATICA: ICD-10-CM

## 2024-03-13 DIAGNOSIS — M54.42 CHRONIC LEFT-SIDED LOW BACK PAIN WITH LEFT-SIDED SCIATICA: ICD-10-CM

## 2024-03-13 PROCEDURE — 97110 THERAPEUTIC EXERCISES: CPT | Mod: GP,KX,CQ

## 2024-03-13 NOTE — PROGRESS NOTES
"Physical Therapy    Physical Therapy Treatment    Patient Name: Diane Rao  MRN: [id number]  Today's Date: 3/13/2024  Time in 1330  Time out 1430  Time calculation 60 minutes    General  No Auth  Onset date 12/26/2023  Visit 15    Assessment:  Provided   seated rest breaks as needed , < 2 minute recovery time   Improving  SLS R/L  with pt managing up to 10\" LLE floor ,RLE up to 5\". Able to add SLS on AIREX with LLE up to 10\" hold R ~3\" hold Patient demonstrated good effort  , motivated   Continued skilled PT services appropriate to address unmet goals to maximize gains in functional mobility, pain reduction and return to active lifestyle.     Plan:   Progress DLS and core/hip strengthening and stabilization exercises, balance activities.        Current Problem  1. Chronic left-sided low back pain with left-sided sciatica          Subjective    I've cut way back on pain medication. Up in kitchen for an hour before sciatica symptoms start setting in  Patient reports no significant pain  some soreness intermittently in shoulders. Starting day with  exercises is effective in decreasing morning lumbar pain.      Precautions  STEADI Fall Risk Score (The score of 4 or more indicates an increased risk of falling): 10  Medical Precautions: No known precautions/limitation    Vitals  97 SpO2%  74 bpm        Pain  2-3/10 shoulder pain  Low back 0/10    Objective  SLS R/L 5-7\"     Treatments:  Treatment   Therapeutic Exercise 55 minutes  Sci fit level 1 seat 11, level 2.0  x 10 minutes    Standing  B mid rows with green theratube on air ex foam 1 x 20 reps  B shoulder extension with green theratube on air ex foam 1 x 20 reps  Alt R/L shoulder extension with green theratube on air ex foam 1 x   Mat exer   // Bars 2 hand support 2.5# ankle cuffs  B heel/toe raises 1 x 20 reps   R/L hip abduction 2 x 10 reps  Seated R/L LAQs with ball squeeze with 2.5 # ankle wts 1 x 11 reps   STS from std chair without UE use 1 x 5 reps  MIP " "on air ex foam with 2.5# ankle wts 2 x 10 reps with minimal use of UE  Side stepping R/L along // bars without UE assist x 3 laps, 2.5# ankle wts.  R/L SLS  up to  10\" L  without  UE support on R 5\"without  UE support   R/L SLS  on AIREX up to  10\" LLE without  UE support  3\" on R without  UE support          Deferred the following 3/11/24=  Ham curls 20 reps alternating    R/L Hip extensions 20 reps  Mini squats 20reps    OP EDUCATION:         Goals:  Active       PT Problem       Pain (Progressing)       Start:  12/26/23    Expected End:  02/28/24       Patient will report decrease in pain level  by >/= 75% in order to perform  ADLs, IADLs,  and leisure/household activities with minimal to no restrictions.       Goal Note       Reports 25% improvement.              ROM (Progressing)       Start:  12/26/23    Expected End:  02/28/24       Patient will demonstrate increased lumbar AROM to WFL and painfree without compensation to ease the performance of ADLs and functional activities.        Goal Note       Lumbar spine movement Loss -   Flexion: minimal  loss, improved  reversal of curve, no pain  Extension: min-mod  loss, no pain   R SGIS:  (hips R) min loss, no pain  L SGIS: (hips L) no loss, no pain              Strength (Progressing)       Start:  12/26/23    Expected End:  02/28/24       Patient will increase trunk and LE strength by 1 MMT grade to facilitate endurance for good postural alignment , to provide spinal protection during activity and static positions, and to enhance stamina for standing, walking and functional mobility.       Goal Note       See objective section for details.              HEP (Progressing)       Start:  12/26/23    Expected End:  02/28/24       Patient will demonstrate independence and compliance with safe and recommended  HEP in order to maximize and maintain functional gains made in physical therapy.       Goal Note       ongoing              Outcome (Progressing)       Start:  " 01/03/24    Expected End:  02/28/24       Patient will improve outcome measures score on Modified Oswestry LBP Disability Index by 15%  to show a clinically significant improvement  in functional mobility.       Goal Note       Improved by 8%              Posture (Met)       Start:  12/26/23    Expected End:  02/28/24    Resolved:  02/23/24    Patient will demonstrate a good working knowledge of proper posture  and self correction in sitting and standing  50% of the time for greater for optimum symptom management/reduction.       Goal Note       See objective section for details.              Flexibility (Progressing)       Start:  12/26/23    Expected End:  02/28/24       Patient will demonstrate improved  flexibility in B hamstrings to 70 deg and piriformis to good  in order to decrease pain, improve positioning and promote improved functional mobility.       Goal Note       Hamstrings L 68 deg min painful, R 70 deg no pain  Gastrocs R/L fair  Piriformis L fair painful, R fair painful

## 2024-03-17 DIAGNOSIS — E78.00 PURE HYPERCHOLESTEROLEMIA: ICD-10-CM

## 2024-03-17 DIAGNOSIS — I10 ESSENTIAL HYPERTENSION: ICD-10-CM

## 2024-03-17 DIAGNOSIS — I82.519 CHRONIC DEEP VEIN THROMBOSIS (DVT) OF FEMORAL VEIN, UNSPECIFIED LATERALITY (MULTI): ICD-10-CM

## 2024-03-18 ENCOUNTER — APPOINTMENT (OUTPATIENT)
Dept: PHYSICAL THERAPY | Facility: CLINIC | Age: 79
End: 2024-03-18
Payer: MEDICARE

## 2024-03-18 RX ORDER — ATORVASTATIN CALCIUM 40 MG/1
40 TABLET, FILM COATED ORAL DAILY
Qty: 90 TABLET | Refills: 1 | Status: SHIPPED | OUTPATIENT
Start: 2024-03-18

## 2024-03-18 RX ORDER — AMLODIPINE BESYLATE 5 MG/1
5 TABLET ORAL DAILY
Qty: 90 TABLET | Refills: 1 | Status: SHIPPED | OUTPATIENT
Start: 2024-03-18

## 2024-03-22 ENCOUNTER — TREATMENT (OUTPATIENT)
Dept: PHYSICAL THERAPY | Facility: CLINIC | Age: 79
End: 2024-03-22
Payer: MEDICARE

## 2024-03-22 DIAGNOSIS — G89.29 CHRONIC LEFT-SIDED LOW BACK PAIN WITH LEFT-SIDED SCIATICA: Primary | ICD-10-CM

## 2024-03-22 DIAGNOSIS — M54.42 CHRONIC LEFT-SIDED LOW BACK PAIN WITH LEFT-SIDED SCIATICA: Primary | ICD-10-CM

## 2024-03-22 PROCEDURE — 97110 THERAPEUTIC EXERCISES: CPT | Mod: GP,KX,CQ

## 2024-03-22 NOTE — PROGRESS NOTES
"Physical Therapy    Physical Therapy Treatment    Patient Name: Diane Rao  MRN: [id number]  Today's Date: 3/22/2024         Assessment:     Plan:       Current Problem  1. Chronic left-sided low back pain with left-sided sciatica            General          Subjective    Precautions     Vital Signs     Pain       Objective   Cognition       Treatments:  {PT Treatments:39869}  Sci fit level 1 seat 11, level 2.0  x 10 minutes     Standing  B mid rows with green theratube on air ex foam 1 x 20 reps  B shoulder extension with green theratube on air ex foam 1 x 20 reps  Alt R/L shoulder extension with green theratube on air ex foam 1 x   Mat exer   // Bars 2 hand support 2.5# ankle cuffs  B heel/toe raises 1 x 20 reps   R/L hip abduction 2 x 10 reps  Seated R/L LAQs with ball squeeze with 2.5 # ankle wts 1 x 11 reps   STS from std chair without UE use 1 x 5 reps  MIP on air ex foam with 2.5# ankle wts 2 x 10 reps with minimal use of UE  Side stepping R/L along // bars without UE assist x 3 laps, 2.5# ankle wts.  R/L SLS  up to  10\" without  UE support on L 5\" on R   R/L SLS  up to  10\" without  UE support on L 3\" on R     OP EDUCATION:       Goals:  Active       PT Problem       Pain (Progressing)       Start:  12/26/23    Expected End:  02/28/24       Patient will report decrease in pain level  by >/= 75% in order to perform  ADLs, IADLs,  and leisure/household activities with minimal to no restrictions.       Goal Note       Reports 25% improvement.              ROM (Progressing)       Start:  12/26/23    Expected End:  02/28/24       Patient will demonstrate increased lumbar AROM to WFL and painfree without compensation to ease the performance of ADLs and functional activities.        Goal Note       Lumbar spine movement Loss -   Flexion: minimal  loss, improved  reversal of curve, no pain  Extension: min-mod  loss, no pain   R SGIS:  (hips R) min loss, no pain  L SGIS: (hips L) no loss, no pain              " Strength (Progressing)       Start:  12/26/23    Expected End:  02/28/24       Patient will increase trunk and LE strength by 1 MMT grade to facilitate endurance for good postural alignment , to provide spinal protection during activity and static positions, and to enhance stamina for standing, walking and functional mobility.       Goal Note       See objective section for details.              HEP (Progressing)       Start:  12/26/23    Expected End:  02/28/24       Patient will demonstrate independence and compliance with safe and recommended  HEP in order to maximize and maintain functional gains made in physical therapy.       Goal Note       ongoing              Outcome (Progressing)       Start:  01/03/24    Expected End:  02/28/24       Patient will improve outcome measures score on Modified Oswestry LBP Disability Index by 15%  to show a clinically significant improvement  in functional mobility.       Goal Note       Improved by 8%              Posture (Met)       Start:  12/26/23    Expected End:  02/28/24    Resolved:  02/23/24    Patient will demonstrate a good working knowledge of proper posture  and self correction in sitting and standing  50% of the time for greater for optimum symptom management/reduction.       Goal Note       See objective section for details.              Flexibility (Progressing)       Start:  12/26/23    Expected End:  02/28/24       Patient will demonstrate improved  flexibility in B hamstrings to 70 deg and piriformis to good  in order to decrease pain, improve positioning and promote improved functional mobility.       Goal Note       Hamstrings L 68 deg min painful, R 70 deg no pain  Gastrocs R/L fair  Piriformis L fair painful, R fair painful

## 2024-03-22 NOTE — PROGRESS NOTES
"Physical Therapy    Physical Therapy Treatment    Patient Name: Diane Rao  MRN: [id number]  Today's Date: 3/22/2024  Time bin 1335  Time out 1420  Time calculation 45 minutes    General  No Auth  Onset date 12/26/2023  Visit 16    Assessment:  Only required 1    seated rest break with < 2 minute recovery time   Improving  SLS R/L  with pt managing up to 10\" LLE floor ,RLE up to 5\". Able to add SLS on AIREX with LLE up to 10\" hold R ~3\" hold Patient demonstrated good effort  , motivated   Continued skilled PT services appropriate to address unmet goals to maximize gains in functional mobility, pain reduction and return to active lifestyle.     Plan:Cont with POC emphasis on progression of DLS and core/hip strengthening and stabilization exercises, balance activities.        Current Problem  1. Chronic left-sided low back pain with left-sided sciatica          Subjective    Patient reports no significant pain. Started session with report of 0/10 shoulder pain    Notes muscle soreness slight between shoulders end of session relieved with retro shoulder rolls  Precautions  STEADI Fall Risk Score (The score of 4 or more indicates an increased risk of falling): 10  Medical Precautions: No known precautions/limitation    Pain  0/10 shoulder pain  Low back 1/10       Treatments:  Treatment   Therapeutic Exercise 45 minutes  Sci fit level 1 seat 11, level 2.0  x 5 minutes    Standing  B mid rows with green thera tube 1 x 20 reps  B shoulder extension with green thera tube 1 x 20 reps     // Bars 2 hand support 2.5# ankle cuffs  B heel/toe raises 1 x 20 reps   R/L hip abduction 2 x 10 reps  On AIREX catch/toss beach ball  able to hold balance 20 tosses  On AIREX beach ball volley  requirng to catch self x 3 , 5 rounds of 5-10 volleys    MIP on air ex foam with 2.5# ankle wts 2 x 10 reps UE crossed over chest  Side stepping R/L along // bars without UE assist x 3 laps, 2.5# ankle wts.    R/L SLS  up to  10\" without  UE " "support  On AIREX managing 5-7\" bilaterally     Seated retro shoulder rolls x 15-20 reps      Goals:  Active       PT Problem       Pain (Progressing)       Start:  12/26/23    Expected End:  02/28/24       Patient will report decrease in pain level  by >/= 75% in order to perform  ADLs, IADLs,  and leisure/household activities with minimal to no restrictions.       Goal Note       Reports 25% improvement.              ROM (Progressing)       Start:  12/26/23    Expected End:  02/28/24       Patient will demonstrate increased lumbar AROM to WFL and painfree without compensation to ease the performance of ADLs and functional activities.        Goal Note       Lumbar spine movement Loss -   Flexion: minimal  loss, improved  reversal of curve, no pain  Extension: min-mod  loss, no pain   R SGIS:  (hips R) min loss, no pain  L SGIS: (hips L) no loss, no pain              Strength (Progressing)       Start:  12/26/23    Expected End:  02/28/24       Patient will increase trunk and LE strength by 1 MMT grade to facilitate endurance for good postural alignment , to provide spinal protection during activity and static positions, and to enhance stamina for standing, walking and functional mobility.       Goal Note       See objective section for details.              HEP (Progressing)       Start:  12/26/23    Expected End:  02/28/24       Patient will demonstrate independence and compliance with safe and recommended  HEP in order to maximize and maintain functional gains made in physical therapy.       Goal Note       ongoing              Outcome (Progressing)       Start:  01/03/24    Expected End:  02/28/24       Patient will improve outcome measures score on Modified Oswestry LBP Disability Index by 15%  to show a clinically significant improvement  in functional mobility.       Goal Note       Improved by 8%              Posture (Met)       Start:  12/26/23    Expected End:  02/28/24    Resolved:  02/23/24    Patient " will demonstrate a good working knowledge of proper posture  and self correction in sitting and standing  50% of the time for greater for optimum symptom management/reduction.       Goal Note       See objective section for details.              Flexibility (Progressing)       Start:  12/26/23    Expected End:  02/28/24       Patient will demonstrate improved  flexibility in B hamstrings to 70 deg and piriformis to good  in order to decrease pain, improve positioning and promote improved functional mobility.       Goal Note       Hamstrings L 68 deg min painful, R 70 deg no pain  Gastrocs R/L fair  Piriformis L fair painful, R fair painful

## 2024-03-25 ENCOUNTER — TREATMENT (OUTPATIENT)
Dept: PHYSICAL THERAPY | Facility: CLINIC | Age: 79
End: 2024-03-25
Payer: MEDICARE

## 2024-03-25 DIAGNOSIS — G89.29 CHRONIC LEFT-SIDED LOW BACK PAIN WITH LEFT-SIDED SCIATICA: ICD-10-CM

## 2024-03-25 DIAGNOSIS — M54.42 CHRONIC LEFT-SIDED LOW BACK PAIN WITH LEFT-SIDED SCIATICA: ICD-10-CM

## 2024-03-25 PROCEDURE — 97110 THERAPEUTIC EXERCISES: CPT | Mod: KX,GP

## 2024-03-25 NOTE — PROGRESS NOTES
"Physical Therapy    Physical Therapy Treatment    Patient Name: Diane Rao  MRN: [id number]  Today's Date: 3/25/2024  Time Calculation  Start Time: 1330  Stop Time: 1412  Time Calculation (min): 42 min      Assessment:  Patient tolerated treatment well without increased complaints of pain during or after session. However, fatigued at the end of session and required several short seated rest breaks.  Challenged by SLS R/L but improving.      Plan:  Continue per POC.  Upgrade HEP with written instructions for core and hip strengthening and balance activities.  Anticipate discharge from PT next week.        Current Problem  1. Chronic left-sided low back pain with left-sided sciatica  Follow Up In Physical Therapy          General  Onset date 12/26/2023  Visit 14  Medicare -no auth required visits [place]      Subjective    Some aches in different places today, didn't have time to do the exercises but the exercises usually help her.  Pain rated 1/10 at present. Reports no problems following last session. Overall, she feels therapy has helped with decreasing pain and improving ability for activity.     Precautions  STEADI Fall Risk Score (The score of 4 or more indicates an increased risk of falling): 10  Medical Precautions: No known precautions/limitation     Pain  1/10 at start and end of session.    Treatments:  Treatment   Therapeutic Exercise 42 minutes  Sci fit level 1.5 seat 11 x 10 minutes    Standing in // bars with UE support as needed:  R/L SLS 5 reps x 20 sec   Marching with holds x 2 laps without UE support  Standing on air ex foam with FT, R/L semitandem beach ball toss x 20 reps each  Romberg on air ex EO and EC 2 x 30\" hold each with SBA/CGA  FWD step ups with opposite tap above R/L , 6\" step 2 x 10 reps with one UE assist  Lateral step dips with opposite hip ABD R/L, 6\" step 1 x 10 reps with BUE assist.     OP EDUCATION:  Continue with current HEP for standing exercises as previously noted.    "     Goals:  Active       PT Problem       Pain (Progressing)       Start:  12/26/23    Expected End:  02/28/24       Patient will report decrease in pain level  by >/= 75% in order to perform  ADLs, IADLs,  and leisure/household activities with minimal to no restrictions.       Goal Note       Reports 25% improvement.              ROM (Progressing)       Start:  12/26/23    Expected End:  02/28/24       Patient will demonstrate increased lumbar AROM to WFL and painfree without compensation to ease the performance of ADLs and functional activities.        Goal Note       Lumbar spine movement Loss -   Flexion: minimal  loss, improved  reversal of curve, no pain  Extension: min-mod  loss, no pain   R SGIS:  (hips R) min loss, no pain  L SGIS: (hips L) no loss, no pain              Strength (Progressing)       Start:  12/26/23    Expected End:  02/28/24       Patient will increase trunk and LE strength by 1 MMT grade to facilitate endurance for good postural alignment , to provide spinal protection during activity and static positions, and to enhance stamina for standing, walking and functional mobility.       Goal Note       See objective section for details.              HEP (Progressing)       Start:  12/26/23    Expected End:  02/28/24       Patient will demonstrate independence and compliance with safe and recommended  HEP in order to maximize and maintain functional gains made in physical therapy.       Goal Note       ongoing              Outcome (Progressing)       Start:  01/03/24    Expected End:  02/28/24       Patient will improve outcome measures score on Modified Oswestry LBP Disability Index by 15%  to show a clinically significant improvement  in functional mobility.       Goal Note       Improved by 8%              Posture (Met)       Start:  12/26/23    Expected End:  02/28/24    Resolved:  02/23/24    Patient will demonstrate a good working knowledge of proper posture  and self correction in sitting  and standing  50% of the time for greater for optimum symptom management/reduction.       Goal Note       See objective section for details.              Flexibility (Progressing)       Start:  12/26/23    Expected End:  02/28/24       Patient will demonstrate improved  flexibility in B hamstrings to 70 deg and piriformis to good  in order to decrease pain, improve positioning and promote improved functional mobility.       Goal Note       Hamstrings L 68 deg min painful, R 70 deg no pain  Gastrocs R/L fair  Piriformis L fair painful, R fair painful

## 2024-03-26 DIAGNOSIS — M54.6 LOWER THORACIC BACK PAIN: ICD-10-CM

## 2024-03-26 DIAGNOSIS — G89.29 CHRONIC LEFT-SIDED LOW BACK PAIN WITH LEFT-SIDED SCIATICA: ICD-10-CM

## 2024-03-26 DIAGNOSIS — R53.1 WEAKNESS: ICD-10-CM

## 2024-03-26 DIAGNOSIS — M54.42 CHRONIC LEFT-SIDED LOW BACK PAIN WITH LEFT-SIDED SCIATICA: ICD-10-CM

## 2024-03-26 NOTE — PROGRESS NOTES
Orders Placed This Encounter   Procedures    Referral to Physical Therapy     Standing Status:   Future     Standing Expiration Date:   9/26/2024     Referral Priority:   Routine     Referral Type:   Consultation     Referral Reason:   Specialty Services Required     Requested Specialty:   Physical Therapy     Number of Visits Requested:   1

## 2024-03-29 ENCOUNTER — TREATMENT (OUTPATIENT)
Dept: PHYSICAL THERAPY | Facility: CLINIC | Age: 79
End: 2024-03-29
Payer: MEDICARE

## 2024-03-29 DIAGNOSIS — G89.29 CHRONIC LEFT-SIDED LOW BACK PAIN WITH LEFT-SIDED SCIATICA: ICD-10-CM

## 2024-03-29 DIAGNOSIS — M54.42 CHRONIC LEFT-SIDED LOW BACK PAIN WITH LEFT-SIDED SCIATICA: ICD-10-CM

## 2024-03-29 PROCEDURE — 97110 THERAPEUTIC EXERCISES: CPT | Mod: GP,KX,CQ

## 2024-03-29 NOTE — PROGRESS NOTES
"Physical Therapy    Physical Therapy Treatment    Patient Name: Diane Rao  MRN: [id number]  Today's Date: 3/29/2024  Time Calculation  Start Time: 1335  Stop Time: 1415  Time Calculation (min): 40 min  General  Onset date 12/26/2023  Visit 18  Medicare -no auth required visits [place]     Assessment:  Pt has a very full well rounded exercise program currently in place to which she  has a daily routine able to describe each exercise in detail including several mat core exer ,upper quadrant exercise and standing program  all with and without resistance . Today suggested adding in a 4\" riser for step up work at home . Spouse states that he will easily fabricate a step platform .  Today no loss of balance , no seated rest breaks.     Plan:  Continue per POC.   Consider FINAL ADDITIONS to HEP with written instructions  balance activities.  Anticipate discharge from PT next week.        Current Problem  1. Chronic left-sided low back pain with left-sided sciatica  Follow Up In Physical Therapy               Subjective    Pt. States \"I'm really doing very well \". End of session pt states that she \"had a good work out'  Spouse present at tail end of visit and notes he can easily make a 'step up bench'  Pt. Considering utilizing [employer] for regular work out    Precautions  STEADI Fall Risk Score (The score of 4 or more indicates an increased risk of falling): 10  Medical Precautions: No known precautions/limitation     Pain  0/10 at start and end of session.  0/10 end of session    Treatments:  Treatment   Therapeutic Exercise 40 minutes    Mat exer  25 reps TA bracing with supine march  25 reps TA bracing with alternating LE extensions from hook lying   3 sets of 10 bridges    Verbal review of extensive HEP pt currently has in place.    Standing open floor / SBA  Green t-band resisted side stepping 60'   Green t-band resisted side stepping ascending /descending ramp 10 reps lead L and lead R    Green t-band resisted diagonal fwd " "walk (in // bars)  R/L SLS 5 reps x 20 sec     Sci fit level 1.5 seat 11 x 10 minutes     OP EDUCATION:  Continue with current HEP for standing exercises as previously noted.    Added 4\" riser fwd and lateral step up exercise handouts provided     Goals:  Active       PT Problem       Pain (Progressing)       Start:  12/26/23    Expected End:  02/28/24       Patient will report decrease in pain level  by >/= 75% in order to perform  ADLs, IADLs,  and leisure/household activities with minimal to no restrictions.       Goal Note       Reports 25% improvement.              ROM (Progressing)       Start:  12/26/23    Expected End:  02/28/24       Patient will demonstrate increased lumbar AROM to WFL and painfree without compensation to ease the performance of ADLs and functional activities.        Goal Note       Lumbar spine movement Loss -   Flexion: minimal  loss, improved  reversal of curve, no pain  Extension: min-mod  loss, no pain   R SGIS:  (hips R) min loss, no pain  L SGIS: (hips L) no loss, no pain              Strength (Progressing)       Start:  12/26/23    Expected End:  02/28/24       Patient will increase trunk and LE strength by 1 MMT grade to facilitate endurance for good postural alignment , to provide spinal protection during activity and static positions, and to enhance stamina for standing, walking and functional mobility.       Goal Note       See objective section for details.              HEP (Progressing)       Start:  12/26/23    Expected End:  02/28/24       Patient will demonstrate independence and compliance with safe and recommended  HEP in order to maximize and maintain functional gains made in physical therapy.       Goal Note       ongoing              Outcome (Progressing)       Start:  01/03/24    Expected End:  02/28/24       Patient will improve outcome measures score on Modified Oswestry LBP Disability Index by 15%  to show a clinically significant improvement  in functional " mobility.       Goal Note       Improved by 8%              Posture (Met)       Start:  12/26/23    Expected End:  02/28/24    Resolved:  02/23/24    Patient will demonstrate a good working knowledge of proper posture  and self correction in sitting and standing  50% of the time for greater for optimum symptom management/reduction.       Goal Note       See objective section for details.              Flexibility (Progressing)       Start:  12/26/23    Expected End:  02/28/24       Patient will demonstrate improved  flexibility in B hamstrings to 70 deg and piriformis to good  in order to decrease pain, improve positioning and promote improved functional mobility.       Goal Note       Hamstrings L 68 deg min painful, R 70 deg no pain  Gastrocs R/L fair  Piriformis L fair painful, R fair painful

## 2024-04-01 ENCOUNTER — TREATMENT (OUTPATIENT)
Dept: PHYSICAL THERAPY | Facility: CLINIC | Age: 79
End: 2024-04-01
Payer: MEDICARE

## 2024-04-01 DIAGNOSIS — G89.29 CHRONIC LEFT-SIDED LOW BACK PAIN WITH LEFT-SIDED SCIATICA: ICD-10-CM

## 2024-04-01 DIAGNOSIS — M54.42 CHRONIC LEFT-SIDED LOW BACK PAIN WITH LEFT-SIDED SCIATICA: ICD-10-CM

## 2024-04-01 PROCEDURE — 97110 THERAPEUTIC EXERCISES: CPT | Mod: GP | Performed by: PHYSICAL THERAPIST

## 2024-04-01 NOTE — PROGRESS NOTES
"Physical Therapy    Physical Therapy Treatment    Patient Name: Diane Rao  MRN: [id number]  Today's Date: 4/1/2024  Time Calculation  Start Time: 1335  Stop Time: 1418  Time Calculation (min): 43 min    Assessment:  Patient tolerated treatment well without increased complaints of pain during or after session.  Patient with mild SOB after each standing exercise / activity.  Patient challenged with standing balance activities on Airex.       Plan:  To continue for 1 more treatment with Recheck at that time with probable discharge.     Current Problem  1. Chronic left-sided low back pain with left-sided sciatica  Follow Up In Physical Therapy        General  Onset date 12/26/2023  Visit 14  Medicare -no auth required visits [place]      Subjective    Patient reports therapy has helped with her lower back pain and shoulder strength.  Currently no pain, pain usually worse in the morning rated at 4/10 pain level until she gets moving.    Precautions  STEADI Fall Risk Score (The score of 4 or more indicates an increased risk of falling): 10  Medical Precautions: No known precautions/limitation     Pain  0/10 at start and end of session.    Treatments:  Treatment   Therapeutic Exercise 45 minutes  Sci fit level 2.5 seat 11 x 10 minutes    Standing in // bars with UE support as needed:  Heel /toe raises x 20 reps   B Calf stretches in standing for gastroc mm with balls of feet on Airex 30 sec x 3 reps  SLS on Airex 15-20 sec x 5 reps each L/R with zero to minimal hand support  Marching with holds x 2 laps without UE support  Standing on airex foam with FT, R/L semitandem beach ball toss x 20 reps each  Romberg on airex EO and EC 2 x 30\" hold each with SBA/CGA - deferred for today  FWD step ups with opposite tap above R/L , 6\" step 2 x 10 reps with one UE assist  Lateral step dips with opposite hip ABD R/L, 6\" step 1 x 10 reps with BUE assist.     OP EDUCATION:  Continue with current HEP for standing exercises as previously " noted.      Goals:  Active       PT Problem       Pain (Progressing)       Start:  12/26/23    Expected End:  02/28/24       Patient will report decrease in pain level  by >/= 75% in order to perform  ADLs, IADLs,  and leisure/household activities with minimal to no restrictions.       Goal Note       Reports 25% improvement.              ROM (Progressing)       Start:  12/26/23    Expected End:  02/28/24       Patient will demonstrate increased lumbar AROM to WFL and painfree without compensation to ease the performance of ADLs and functional activities.        Goal Note       Lumbar spine movement Loss -   Flexion: minimal  loss, improved  reversal of curve, no pain  Extension: min-mod  loss, no pain   R SGIS:  (hips R) min loss, no pain  L SGIS: (hips L) no loss, no pain              Strength (Progressing)       Start:  12/26/23    Expected End:  02/28/24       Patient will increase trunk and LE strength by 1 MMT grade to facilitate endurance for good postural alignment , to provide spinal protection during activity and static positions, and to enhance stamina for standing, walking and functional mobility.       Goal Note       See objective section for details.              HEP (Progressing)       Start:  12/26/23    Expected End:  02/28/24       Patient will demonstrate independence and compliance with safe and recommended  HEP in order to maximize and maintain functional gains made in physical therapy.       Goal Note       ongoing              Outcome (Progressing)       Start:  01/03/24    Expected End:  02/28/24       Patient will improve outcome measures score on Modified Oswestry LBP Disability Index by 15%  to show a clinically significant improvement  in functional mobility.       Goal Note       Improved by 8%              Posture (Met)       Start:  12/26/23    Expected End:  02/28/24    Resolved:  02/23/24    Patient will demonstrate a good working knowledge of proper posture  and self correction in  sitting and standing  50% of the time for greater for optimum symptom management/reduction.       Goal Note       See objective section for details.              Flexibility (Progressing)       Start:  12/26/23    Expected End:  02/28/24       Patient will demonstrate improved  flexibility in B hamstrings to 70 deg and piriformis to good  in order to decrease pain, improve positioning and promote improved functional mobility.       Goal Note       Hamstrings L 68 deg min painful, R 70 deg no pain  Gastrocs R/L fair  Piriformis L fair painful, R fair painful

## 2024-04-03 ENCOUNTER — OFFICE VISIT (OUTPATIENT)
Dept: BEHAVIORAL HEALTH | Facility: CLINIC | Age: 79
End: 2024-04-03
Payer: MEDICARE

## 2024-04-03 DIAGNOSIS — F90.9 ATTENTION DEFICIT DISORDER WITH HYPERACTIVITY: ICD-10-CM

## 2024-04-03 DIAGNOSIS — F33.41 RECURRENT MAJOR DEPRESSIVE DISORDER, IN PARTIAL REMISSION (CMS-HCC): ICD-10-CM

## 2024-04-03 DIAGNOSIS — F41.1 GAD (GENERALIZED ANXIETY DISORDER): ICD-10-CM

## 2024-04-03 PROCEDURE — 1160F RVW MEDS BY RX/DR IN RCRD: CPT | Performed by: PSYCHIATRY & NEUROLOGY

## 2024-04-03 PROCEDURE — 1159F MED LIST DOCD IN RCRD: CPT | Performed by: PSYCHIATRY & NEUROLOGY

## 2024-04-03 PROCEDURE — 99214 OFFICE O/P EST MOD 30 MIN: CPT | Performed by: PSYCHIATRY & NEUROLOGY

## 2024-04-03 RX ORDER — DEXTROAMPHETAMINE SACCHARATE, AMPHETAMINE ASPARTATE, DEXTROAMPHETAMINE SULFATE AND AMPHETAMINE SULFATE 2.5; 2.5; 2.5; 2.5 MG/1; MG/1; MG/1; MG/1
10 TABLET ORAL 3 TIMES DAILY
Qty: 270 TABLET | Refills: 0 | Status: SHIPPED | OUTPATIENT
Start: 2024-04-03 | End: 2024-04-03 | Stop reason: SDUPTHER

## 2024-04-03 RX ORDER — LORAZEPAM 0.5 MG/1
0.5 TABLET ORAL EVERY 8 HOURS PRN
Qty: 270 TABLET | Refills: 0 | Status: SHIPPED | OUTPATIENT
Start: 2024-04-03 | End: 2024-07-02

## 2024-04-03 RX ORDER — FLUOXETINE 20 MG/1
20 TABLET ORAL 2 TIMES DAILY
Qty: 180 TABLET | Refills: 1 | Status: SHIPPED | OUTPATIENT
Start: 2024-04-03 | End: 2024-04-03 | Stop reason: SDUPTHER

## 2024-04-03 RX ORDER — FLUOXETINE 20 MG/1
20 TABLET ORAL 2 TIMES DAILY
Qty: 180 TABLET | Refills: 1 | Status: SHIPPED | OUTPATIENT
Start: 2024-04-03 | End: 2024-09-30

## 2024-04-03 RX ORDER — DEXTROAMPHETAMINE SACCHARATE, AMPHETAMINE ASPARTATE, DEXTROAMPHETAMINE SULFATE AND AMPHETAMINE SULFATE 2.5; 2.5; 2.5; 2.5 MG/1; MG/1; MG/1; MG/1
10 TABLET ORAL 3 TIMES DAILY
Qty: 270 TABLET | Refills: 0 | Status: SHIPPED | OUTPATIENT
Start: 2024-04-03 | End: 2024-04-05 | Stop reason: SDUPTHER

## 2024-04-03 RX ORDER — LORAZEPAM 0.5 MG/1
0.5 TABLET ORAL EVERY 8 HOURS PRN
Qty: 270 TABLET | Refills: 0 | Status: SHIPPED | OUTPATIENT
Start: 2024-04-03 | End: 2024-04-03 | Stop reason: SDUPTHER

## 2024-04-03 NOTE — PROGRESS NOTES
"Outpatient Psychiatry - Med Management Followup      Location of service:  _X__ Office         ___ A/V            ___Telephone (3 factor ID completed)           Subjective   Diane Rao, a 78 y.o. female, for followup visit.              Assessment/Plan   Problem List Items Addressed This Visit               ICD-10-CM     Major depression, recurrent (CMS/HCC) F33.9     Attention deficit disorder without hyperactivity F98.8     GLNE (generalized anxiety disorder) F41.1         Treatment Plan/Recommendations:   Continue current medications and support  Followup in 3 months  Needs Adderall and prn Lorazepam renewed; has Prozac        Referral to:  NA     Review with patient:   Follow-up plan was discussed with patient.  Treatment plan reviewed with the patient.  Medication risks/benefit reviewed with the patient     HPI:    Reports again that her dog  (actually about 4 months ago) and she continues to grieve.  She had withdrawn from most social activities but has now started to get out to see people, such as family over the holidays.  Greater concern is that her son had an occiputal stroke and partial vision loss; vision is slowly returning and he is on 'blood thinners,' still having workup to determine source of embolus.  He is her younger son at age 57, but is doing well.  She notes she has returned to her [employer] in full and is asking God to bring Marcello back, we need him more than ever.    She now has several great grandchildren as first child born when she was 20...talks about her family and husbands extremely large (around 90 people?) family in the area; becomes overwhelmed when large family crowd get together.  Pandemic \"taught me how much I appreciate quiet!\"  Otherwise doing well as usual:  Has 15th wedding anniversary coming up, and 11th anniversary of her return to sobriety.  Denies panic attacks, attentional difficulties, significant depressive symptoms at this time.  Needs medication refills on " "Adderall, and requests renewal on prn Lorazepam; inadvertently got double supply of fluoxetine so will have sufficient pills likely through end of year.  Encouraged to try to cut back to 2 Adderall periodically, especially as drug shortage is again anticipated...     Current Medications:    FLUoxetine (PROzac) 20 mg capsule, Take 1 capsule (20 mg) by mouth 2 times a day     amphetamine-dextroamphetamine (Adderall) 10 mg tablet, Take 1 tablet (10 mg) by mouth 3 times a day     LORazepam (Ativan) 0.5 mg tablet, Take 1 tablet (0.5 mg) by mouth every 8 hours if needed for anxiety., Disp: 270 tablet, Rfl: 0      Interval Medical History:  Not significant - no medication changes, no hospitalizations or major evenets     Psychiatric Review Of Systems:   Depressive Symptoms: not significant at this time  Manic Symptoms: NA  Anxiety Symptoms: reduced with medication at this time  Disordered Eating Symptoms: NA  Inattentive Symptoms: improved with medication  Hyperactive/Impulsive Symptoms: NA  Trauma Symptoms: NA  Conduct Issues: NA  Psychotic Symptoms: NA  Developmental Concerns: NA  Other Symptoms/Concerns:NA  Delirium/Altered Mental Status Symptoms: NA              Objective   Mental Status Exam:  Patient is a well nourished, well developed white female  appearing to be approximately stated age  Appearance:   Well groomed, appropriately dressed   Attention:  Oriented X 3  Speech:         Form:  fluent, coherent, relevant, organized; evidence for formal thought disorder       Process:  abstract; without abnormal associations       Content:  without hallucinations, delusions,  denies suicidal or homicidal ideation  Movements: No apparent gait disturbance or abnormal involuntary movements of face or trunk  Mood:  \"Good\"  Affect:  Euthymic  Cognition:   Grossly intact and appropriate to level of educational attainment  Judgement:   Good  Insight:   Good         Vitals:  There were no vitals filed for this visit.        Total " "time spent 30\" with patient; at least 50% of time spent in counseling, explanation of diagnosis, planning of further management, and coordination of care     Jeff Burk MD MPH     "

## 2024-04-05 ENCOUNTER — TREATMENT (OUTPATIENT)
Dept: PHYSICAL THERAPY | Facility: CLINIC | Age: 79
End: 2024-04-05
Payer: MEDICARE

## 2024-04-05 DIAGNOSIS — G89.29 CHRONIC LEFT-SIDED LOW BACK PAIN WITH LEFT-SIDED SCIATICA: Primary | ICD-10-CM

## 2024-04-05 DIAGNOSIS — M54.42 CHRONIC LEFT-SIDED LOW BACK PAIN WITH LEFT-SIDED SCIATICA: Primary | ICD-10-CM

## 2024-04-05 DIAGNOSIS — F90.9 ATTENTION DEFICIT DISORDER WITH HYPERACTIVITY: ICD-10-CM

## 2024-04-05 PROCEDURE — 97530 THERAPEUTIC ACTIVITIES: CPT | Mod: KX,GP

## 2024-04-05 RX ORDER — DEXTROAMPHETAMINE SACCHARATE, AMPHETAMINE ASPARTATE, DEXTROAMPHETAMINE SULFATE AND AMPHETAMINE SULFATE 2.5; 2.5; 2.5; 2.5 MG/1; MG/1; MG/1; MG/1
10 TABLET ORAL DAILY
Qty: 90 TABLET | Refills: 0 | Status: SHIPPED | OUTPATIENT
Start: 2024-04-05 | End: 2024-04-05 | Stop reason: WASHOUT

## 2024-04-05 RX ORDER — DEXTROAMPHETAMINE SACCHARATE, AMPHETAMINE ASPARTATE, DEXTROAMPHETAMINE SULFATE AND AMPHETAMINE SULFATE 2.5; 2.5; 2.5; 2.5 MG/1; MG/1; MG/1; MG/1
10 TABLET ORAL DAILY
Qty: 90 TABLET | Refills: 0 | Status: SHIPPED | OUTPATIENT
Start: 2024-04-05 | End: 2024-04-05 | Stop reason: SDUPTHER

## 2024-04-05 NOTE — PROGRESS NOTES
Physical Therapy    Physical Therapy Treatment/PT Discharge Summary    Patient Name: Diane Rao  MRN: [id number]  Today's Date: 4/5/24  Time Calculation  Start Time: 1331  Stop Time: 1412  Time Calculation (min): 41 min      Assessment:  Overall, patient has made very good progress in PT.  PT goals achieved or partially achieved.  See below for details regarding goal status.  Patient has been instructed in appropriate HEP to maximize and maintain gains made in PT.  She demonstrates improvements in pain levels,  strength, ROM, flexibility and tolerance for increasing functional activity and walking. Improved quality of life by decreasing sedentary lifestyle. Patient reports she is compliant with HEP and was encouraged to continue indep with HEP and follow up with MD as needed.      Plan:  Discharge PT.       Current Problem  1. Chronic left-sided low back pain with left-sided sciatica  Follow Up In Physical Therapy      2. Weakness                 General  Reason for visit:  Chronic left low back pain with LLE sciatica  Referred by: Dr. Keyona Cam  Visit 20  Onset date 12/26/23  Past Medical History Relevant to Rehab: HTN, neuropathy       Subjective    Patient reports she still has minimal arthritis pain in multiple areas which comes and goes. Had some numbness in left foot this AM but went away quickly and is fine now.  Overall, reports 60 % improvement since starting therapy.  States improvements include decreased pain, improved sitting tolerance, increased strength, better stamina for prolonged walking and activity, less sedentary - more active now, walking more, easier to get in and out of the car, and not having as much bladder leakage. Denies recent falls or problems with balance. Denies pain at present.  Pain 4/10 at worst and usually in the AM, but improves when she starts moving.     Precautions  Precautions  Medical Precautions: No known precautions/limitation    Pain  0/10 at start and end of  "session  Left clinic in no apparent distress     Objective   Lumbar AROM  Flexion WFL, no pain, increased reversal of lumbar curve  Extension WFL, no pain  Rotation R/L WFL, no pain   Side bending L WFL, tightness during motion  Side bending R WFL, no pain  Repeated FIS x 10 - no pain  Repeated EIS x 10 - no pain    Flexibility  Piriformis R/L good - end range tightness  Hamstrings R 75 deg, L 73 deg - no pain  Gastrocs R/L good     Posture  Improved postural awareness in sitting and standing positions.   Good sitting and standing posture - no forward flexed trunk,  no trunk rotation, equal weight bearing BLEs , level pelvis and PSIS.    Strength   Hip flexion sitting B 5/5 no pain  Hip abduction R 4/5 no pain, L 4/5 no pain  Knee extension R/L 5/5  Knee flexion R/L 5/5  Ankles DF R/L 5/5  Abdominals fair  DLS fair plus  Postural endurance fair and improving    Balance  Romberg 4/6  SLS R ~16-17\", L ~ 20 \"    Outcome Measures  Modified Oswestry Low Back Pain Disability Index score  12, 24% disabled  (improved from 23, 46% at last recheck and 27, 54% at eval)     Treatments:  Therapeutic Activities 41 mins  RE-check completed this date, see subjective and objective sections for details.   Verbally reviewed HEP as described below.  Patient verbalizes understanding.  Written instructions provided for patient reference.     OP EDUCATION:  Access Code: [email or web address]  URL: [email or web address]  Date: 04/05/2024  Prepared by: Nadine Britton    Exercises  - Step Up  - 1 x daily - 5-6 x weekly - 2 sets - 10 reps  - Lateral Step Ups  - 1 x daily - 5-6 x weekly - 2 sets - 10 reps    Access Code: [email or web address]  URL: [email or web address]  Date: 04/05/2024  Prepared by: Nadine Britton    Exercises  - Seated Cervical Retraction  - 1 x daily - 7 x weekly - 2 sets - 10 reps  - Supine Cervical Retraction with Towel  - 1 x daily - 7 x weekly - 2 sets - 10 reps  - Supine Shoulder Horizontal Abduction " with Resistance  - 1 x daily - 7 x weekly - 2 sets - 10 reps  - Supine Shoulder External Rotation with Resistance  - 1 x daily - 7 x weekly - 2 sets - 10 reps  - Supine PNF D2 Flexion with Resistance  - 1 x daily - 7 x weekly - 2 sets - 10 reps  - Hooklying Clamshell with Resistance  - 1 x daily - 7 x weekly - 2 sets - 10 reps  - Bridge with Hip Abduction and Resistance  - 1 x daily - 7 x weekly - 2 sets - 10 reps  - Supine Hip Adduction Isometric with Ball  - 1 x daily - 7 x weekly - 2 sets - 10 reps       Goals:  Resolved       PT Problem       Pain (Adequate for Discharge)       Start:  12/26/23    Expected End:  02/28/24       Patient will report decrease in pain level  by >/= 75% in order to perform  ADLs, IADLs,  and leisure/household activities with minimal to no restrictions.       Goal Note       Patient reports 60% improvement overall.               ROM (Met)       Start:  12/26/23    Expected End:  02/28/24    Resolved:  04/07/24    Patient will demonstrate increased lumbar AROM to WFL and painfree without compensation to ease the performance of ADLs and functional activities.        Goal Note       See Objective section for details.              Strength (Met)       Start:  12/26/23    Expected End:  02/28/24    Resolved:  04/07/24    Patient will increase trunk and LE strength by 1 MMT grade to facilitate endurance for good postural alignment , to provide spinal protection during activity and static positions, and to enhance stamina for standing, walking and functional mobility.       Goal Note       See Objective section for details.              HEP (Met)       Start:  12/26/23    Expected End:  02/28/24    Resolved:  04/07/24    Patient will demonstrate independence and compliance with safe and recommended  HEP in order to maximize and maintain functional gains made in physical therapy.          Outcome (Met)       Start:  01/03/24    Expected End:  02/28/24    Resolved:  04/07/24    Patient will  improve outcome measures score on Modified Oswestry LBP Disability Index by 15%  to show a clinically significant improvement  in functional mobility.       Goal Note       Improved from 54% to 24%.              Posture (Met)       Start:  12/26/23    Expected End:  02/28/24    Resolved:  02/23/24    Patient will demonstrate a good working knowledge of proper posture  and self correction in sitting and standing  50% of the time for greater for optimum symptom management/reduction.       Goal Note       See objective section for details.              Flexibility (Met)       Start:  12/26/23    Expected End:  02/28/24    Resolved:  04/07/24    Patient will demonstrate improved  flexibility in B hamstrings to 70 deg and piriformis to good  in order to decrease pain, improve positioning and promote improved functional mobility.       Goal Note       See Objective section for details.

## 2024-04-10 DIAGNOSIS — F41.1 GAD (GENERALIZED ANXIETY DISORDER): ICD-10-CM

## 2024-04-12 DIAGNOSIS — F41.1 GAD (GENERALIZED ANXIETY DISORDER): ICD-10-CM

## 2024-04-12 RX ORDER — LORAZEPAM 0.5 MG/1
0.5 TABLET ORAL EVERY 8 HOURS PRN
Qty: 270 TABLET | Refills: 1 | Status: SHIPPED | OUTPATIENT
Start: 2024-04-12 | End: 2024-10-09

## 2024-04-12 RX ORDER — DEXTROAMPHETAMINE SACCHARATE, AMPHETAMINE ASPARTATE, DEXTROAMPHETAMINE SULFATE AND AMPHETAMINE SULFATE 2.5; 2.5; 2.5; 2.5 MG/1; MG/1; MG/1; MG/1
30 TABLET ORAL DAILY
Qty: 270 TABLET | Refills: 0 | Status: SHIPPED | OUTPATIENT
Start: 2024-04-12 | End: 2024-07-11

## 2024-04-17 RX ORDER — LORAZEPAM 0.5 MG/1
TABLET ORAL
Qty: 270 TABLET | Refills: 0 | OUTPATIENT
Start: 2024-04-17

## 2024-05-30 ENCOUNTER — TELEPHONE (OUTPATIENT)
Dept: PRIMARY CARE | Facility: CLINIC | Age: 79
End: 2024-05-30
Payer: MEDICARE

## 2024-06-02 DIAGNOSIS — E03.9 ACQUIRED HYPOTHYROIDISM: ICD-10-CM

## 2024-06-02 DIAGNOSIS — N95.2 POSTMENOPAUSAL ATROPHIC VAGINITIS: Primary | ICD-10-CM

## 2024-06-03 RX ORDER — LEVOTHYROXINE SODIUM 50 UG/1
50 TABLET ORAL DAILY
Qty: 90 TABLET | Refills: 1 | Status: SHIPPED | OUTPATIENT
Start: 2024-06-03

## 2024-06-03 RX ORDER — ESTRADIOL 0.1 MG/G
CREAM VAGINAL
Qty: 42.5 G | Refills: 1 | Status: SHIPPED | OUTPATIENT
Start: 2024-06-03

## 2024-06-28 DIAGNOSIS — E03.9 ACQUIRED HYPOTHYROIDISM: ICD-10-CM

## 2024-06-28 DIAGNOSIS — I10 ESSENTIAL HYPERTENSION: ICD-10-CM

## 2024-06-28 DIAGNOSIS — E78.00 PURE HYPERCHOLESTEROLEMIA: ICD-10-CM

## 2024-06-28 DIAGNOSIS — Z00.00 ROUTINE GENERAL MEDICAL EXAMINATION AT HEALTH CARE FACILITY: ICD-10-CM

## 2024-06-28 DIAGNOSIS — R73.01 IMPAIRED FASTING GLUCOSE: ICD-10-CM

## 2024-06-28 DIAGNOSIS — E55.9 VITAMIN D DEFICIENCY: ICD-10-CM

## 2024-06-28 NOTE — PROGRESS NOTES
Orders Placed This Encounter   Procedures    Comprehensive metabolic panel     Standing Status:   Future     Standing Expiration Date:   6/28/2025     Order Specific Question:   Release result to MyChart     Answer:   Immediate [1]    CK     Standing Status:   Future     Standing Expiration Date:   6/28/2025     Order Specific Question:   Release result to MyChart     Answer:   Immediate [1]    Tsh With Reflex To Free T4 If Abnormal     Standing Status:   Future     Standing Expiration Date:   6/28/2025     Order Specific Question:   Release result to MyChart     Answer:   Immediate [1]    Lipid panel     Standing Status:   Future     Standing Expiration Date:   6/28/2025     Order Specific Question:   Release result to MyChart     Answer:   Immediate [1]    CBC and Auto Differential     Standing Status:   Future     Standing Expiration Date:   6/28/2025     Order Specific Question:   Release result to MyChart     Answer:   Immediate [1]    Hemoglobin A1c     Standing Status:   Future     Standing Expiration Date:   6/28/2025     Order Specific Question:   Release result to MyChart     Answer:   Immediate [1]

## 2024-07-10 ENCOUNTER — APPOINTMENT (OUTPATIENT)
Dept: BEHAVIORAL HEALTH | Facility: CLINIC | Age: 79
End: 2024-07-10
Payer: MEDICARE

## 2024-07-10 DIAGNOSIS — F33.41 RECURRENT MAJOR DEPRESSIVE DISORDER, IN PARTIAL REMISSION (CMS-HCC): ICD-10-CM

## 2024-07-10 DIAGNOSIS — F41.1 GAD (GENERALIZED ANXIETY DISORDER): ICD-10-CM

## 2024-07-10 DIAGNOSIS — F90.9 ATTENTION DEFICIT DISORDER WITH HYPERACTIVITY: ICD-10-CM

## 2024-07-10 PROCEDURE — 99214 OFFICE O/P EST MOD 30 MIN: CPT | Performed by: PSYCHIATRY & NEUROLOGY

## 2024-07-10 RX ORDER — DEXTROAMPHETAMINE SACCHARATE, AMPHETAMINE ASPARTATE, DEXTROAMPHETAMINE SULFATE AND AMPHETAMINE SULFATE 2.5; 2.5; 2.5; 2.5 MG/1; MG/1; MG/1; MG/1
10 TABLET ORAL 3 TIMES DAILY
Qty: 270 TABLET | Refills: 0 | Status: SHIPPED | OUTPATIENT
Start: 2024-07-10 | End: 2024-10-08

## 2024-07-10 NOTE — PROGRESS NOTES
Outpatient Psychiatry - Med Management Followup      Location of service:  _X__ Office         ___ A/V            ___Telephone (3 factor ID completed)          Subjective   Diane Rao, a 78 y.o. female, for followup visit.          Assessment/Plan   Problem List Items Addressed This Visit               ICD-10-CM     Major depression, recurrent (CMS/HCC) F33.9     Attention deficit disorder without hyperactivity F98.8     GLEN (generalized anxiety disorder) F41.1         Treatment Plan/Recommendations:   Continue current medications and support  Followup in 3 months  Needs Adderall renewed; has Prozac        Referral to:  NA     Review with patient:   Follow-up plan was discussed with patient.  Treatment plan reviewed with the patient.  Medication risks/benefit reviewed with the patient     HPI:    Doing well as usual:  Continues her sobriety, happy in marriage, getting out and exercising.   Denies panic attacks, attentional difficulties, significant depressive symptoms at this time.   Is concerned about minor word-finding issues; does not think she has Alzheimers.  Discussed getting baseline cognitive testing but she is fine with reassurance and chooses to defer.  Otherwise status quo Needs medication refills on Adderall:  Does not have controlled substance agreement on file so Stimulant agreement filled out and forwarded, patient will sign and return.  Does not need lorazepam so will defer agreement for Benzo's at this time.     Current Medications:    FLUoxetine (PROzac) 20 mg capsule, Take 1 capsule (20 mg) by mouth 2 times a day     amphetamine-dextroamphetamine (Adderall) 10 mg tablet, Take 1 tablet (10 mg) by mouth 3 times a day     LORazepam (Ativan) 0.5 mg tablet, Take 1 tablet (0.5 mg) by mouth every 8 hours if needed for anxiety., Disp: 270 tablet, Rfl: 0      Interval Medical History:  Not significant - no medication changes, no hospitalizations or major evenets     Psychiatric Review Of Systems:  "  Depressive Symptoms: not significant at this time  Manic Symptoms: NA  Anxiety Symptoms: reduced with medication at this time  Disordered Eating Symptoms: NA  Inattentive Symptoms: improved with medication  Hyperactive/Impulsive Symptoms: NA  Trauma Symptoms: NA  Conduct Issues: NA  Psychotic Symptoms: NA  Developmental Concerns: NA  Other Symptoms/Concerns:NA  Delirium/Altered Mental Status Symptoms: NA              Objective   Mental Status Exam:  Patient is a well nourished, well developed white female  appearing to be approximately stated age  Appearance:   Well groomed, appropriately dressed   Attention:  Oriented X 3  Speech:         Form:  fluent, coherent, relevant, organized; evidence for formal thought disorder       Process:  abstract; without abnormal associations       Content:  without hallucinations, delusions,  denies suicidal or homicidal ideation  Movements: No apparent gait disturbance or abnormal involuntary movements of face or trunk  Mood:  \"Good\"  Affect:  Euthymic  Cognition:   Grossly intact and appropriate to level of educational attainment  Judgement:   Good  Insight:   Good         Vitals:  There were no vitals filed for this visit.        Total time spent 30\" with patient; at least 50% of time spent in counseling, explanation of diagnosis, planning of further management, and coordination of care     Jeff Burk MD MPH  "

## 2024-07-22 ENCOUNTER — LAB (OUTPATIENT)
Dept: LAB | Facility: LAB | Age: 79
End: 2024-07-22
Payer: MEDICARE

## 2024-07-22 DIAGNOSIS — E03.9 ACQUIRED HYPOTHYROIDISM: ICD-10-CM

## 2024-07-22 DIAGNOSIS — Z00.00 ROUTINE GENERAL MEDICAL EXAMINATION AT HEALTH CARE FACILITY: ICD-10-CM

## 2024-07-22 DIAGNOSIS — R73.01 IMPAIRED FASTING GLUCOSE: ICD-10-CM

## 2024-07-22 DIAGNOSIS — E55.9 VITAMIN D DEFICIENCY: ICD-10-CM

## 2024-07-22 DIAGNOSIS — E78.00 PURE HYPERCHOLESTEROLEMIA: ICD-10-CM

## 2024-07-22 DIAGNOSIS — I10 ESSENTIAL HYPERTENSION: ICD-10-CM

## 2024-07-22 LAB
ALBUMIN SERPL BCP-MCNC: 3.9 G/DL (ref 3.4–5)
ALP SERPL-CCNC: 109 U/L (ref 33–136)
ALT SERPL W P-5'-P-CCNC: 23 U/L (ref 7–45)
ANION GAP SERPL CALC-SCNC: 13 MMOL/L (ref 10–20)
AST SERPL W P-5'-P-CCNC: 27 U/L (ref 9–39)
BASOPHILS # BLD AUTO: 0.03 X10*3/UL (ref 0–0.1)
BASOPHILS NFR BLD AUTO: 0.3 %
BILIRUB SERPL-MCNC: 0.8 MG/DL (ref 0–1.2)
BUN SERPL-MCNC: 19 MG/DL (ref 6–23)
CALCIUM SERPL-MCNC: 9 MG/DL (ref 8.6–10.3)
CHLORIDE SERPL-SCNC: 103 MMOL/L (ref 98–107)
CHOLEST SERPL-MCNC: 146 MG/DL (ref 0–199)
CHOLESTEROL/HDL RATIO: 3.2
CK SERPL-CCNC: 43 U/L (ref 0–215)
CO2 SERPL-SCNC: 28 MMOL/L (ref 21–32)
CREAT SERPL-MCNC: 0.85 MG/DL (ref 0.5–1.05)
EGFRCR SERPLBLD CKD-EPI 2021: 71 ML/MIN/1.73M*2
EOSINOPHIL # BLD AUTO: 0.16 X10*3/UL (ref 0–0.4)
EOSINOPHIL NFR BLD AUTO: 1.6 %
ERYTHROCYTE [DISTWIDTH] IN BLOOD BY AUTOMATED COUNT: 13.5 % (ref 11.5–14.5)
EST. AVERAGE GLUCOSE BLD GHB EST-MCNC: 123 MG/DL
GLUCOSE SERPL-MCNC: 121 MG/DL (ref 74–99)
HBA1C MFR BLD: 5.9 %
HCT VFR BLD AUTO: 44.6 % (ref 36–46)
HDLC SERPL-MCNC: 45 MG/DL
HGB BLD-MCNC: 14.3 G/DL (ref 12–16)
IMM GRANULOCYTES # BLD AUTO: 0.04 X10*3/UL (ref 0–0.5)
IMM GRANULOCYTES NFR BLD AUTO: 0.4 % (ref 0–0.9)
LDLC SERPL CALC-MCNC: 69 MG/DL
LYMPHOCYTES # BLD AUTO: 2.15 X10*3/UL (ref 0.8–3)
LYMPHOCYTES NFR BLD AUTO: 21.8 %
MCH RBC QN AUTO: 29.4 PG (ref 26–34)
MCHC RBC AUTO-ENTMCNC: 32.1 G/DL (ref 32–36)
MCV RBC AUTO: 92 FL (ref 80–100)
MONOCYTES # BLD AUTO: 1.17 X10*3/UL (ref 0.05–0.8)
MONOCYTES NFR BLD AUTO: 11.9 %
NEUTROPHILS # BLD AUTO: 6.32 X10*3/UL (ref 1.6–5.5)
NEUTROPHILS NFR BLD AUTO: 64 %
NON HDL CHOLESTEROL: 101 MG/DL (ref 0–149)
NRBC BLD-RTO: 0 /100 WBCS (ref 0–0)
PLATELET # BLD AUTO: 307 X10*3/UL (ref 150–450)
POTASSIUM SERPL-SCNC: 4.5 MMOL/L (ref 3.5–5.3)
PROT SERPL-MCNC: 6.1 G/DL (ref 6.4–8.2)
RBC # BLD AUTO: 4.87 X10*6/UL (ref 4–5.2)
SODIUM SERPL-SCNC: 139 MMOL/L (ref 136–145)
TRIGL SERPL-MCNC: 161 MG/DL (ref 0–149)
TSH SERPL-ACNC: 2.03 MIU/L (ref 0.44–3.98)
VLDL: 32 MG/DL (ref 0–40)
WBC # BLD AUTO: 9.9 X10*3/UL (ref 4.4–11.3)

## 2024-07-22 PROCEDURE — 85025 COMPLETE CBC W/AUTO DIFF WBC: CPT

## 2024-07-22 PROCEDURE — 80053 COMPREHEN METABOLIC PANEL: CPT

## 2024-07-22 PROCEDURE — 36415 COLL VENOUS BLD VENIPUNCTURE: CPT

## 2024-07-22 PROCEDURE — 83036 HEMOGLOBIN GLYCOSYLATED A1C: CPT

## 2024-07-22 PROCEDURE — 84443 ASSAY THYROID STIM HORMONE: CPT

## 2024-07-22 PROCEDURE — 82550 ASSAY OF CK (CPK): CPT

## 2024-07-22 PROCEDURE — 80061 LIPID PANEL: CPT

## 2024-07-26 NOTE — PROGRESS NOTES
Subjective   Patient ID: Diane Rao is a 78 y.o. female who presents for Follow-up.    HPI     Review of Systems    Objective   There were no vitals taken for this visit.    Physical Exam    Assessment/Plan

## 2024-07-29 ENCOUNTER — APPOINTMENT (OUTPATIENT)
Dept: PRIMARY CARE | Facility: CLINIC | Age: 79
End: 2024-07-29
Payer: MEDICARE

## 2024-07-29 VITALS
DIASTOLIC BLOOD PRESSURE: 85 MMHG | HEIGHT: 63 IN | WEIGHT: 177.8 LBS | BODY MASS INDEX: 31.5 KG/M2 | HEART RATE: 64 BPM | SYSTOLIC BLOOD PRESSURE: 145 MMHG

## 2024-07-29 DIAGNOSIS — E03.9 ACQUIRED HYPOTHYROIDISM: ICD-10-CM

## 2024-07-29 DIAGNOSIS — I82.4Y9 DEEP VEIN THROMBOSIS (DVT) OF PROXIMAL LOWER EXTREMITY, UNSPECIFIED CHRONICITY, UNSPECIFIED LATERALITY (MULTI): ICD-10-CM

## 2024-07-29 DIAGNOSIS — Z79.01 WARFARIN ANTICOAGULATION: ICD-10-CM

## 2024-07-29 DIAGNOSIS — R51.9 LEFT-SIDED HEADACHE: Primary | ICD-10-CM

## 2024-07-29 DIAGNOSIS — D68.62 LUPUS ANTICOAGULANT DISORDER (MULTI): ICD-10-CM

## 2024-07-29 DIAGNOSIS — E78.00 PURE HYPERCHOLESTEROLEMIA: ICD-10-CM

## 2024-07-29 DIAGNOSIS — R73.01 IMPAIRED FASTING GLUCOSE: ICD-10-CM

## 2024-07-29 DIAGNOSIS — S09.90XA CLOSED HEAD INJURY, INITIAL ENCOUNTER: ICD-10-CM

## 2024-07-29 DIAGNOSIS — I10 ESSENTIAL HYPERTENSION: ICD-10-CM

## 2024-07-29 DIAGNOSIS — F10.21 HISTORY OF ALCOHOLISM (MULTI): ICD-10-CM

## 2024-07-29 DIAGNOSIS — Z01.00 ENCOUNTER FOR VISION SCREENING: ICD-10-CM

## 2024-07-29 DIAGNOSIS — R82.81 PYURIA: ICD-10-CM

## 2024-07-29 DIAGNOSIS — R35.0 URINE FREQUENCY: ICD-10-CM

## 2024-07-29 LAB
APPEARANCE UR: ABNORMAL
BACTERIA #/AREA URNS AUTO: ABNORMAL /HPF
BILIRUB UR STRIP.AUTO-MCNC: NEGATIVE MG/DL
CAOX CRY #/AREA UR COMP ASSIST: ABNORMAL /HPF
COLOR UR: YELLOW
GLUCOSE UR STRIP.AUTO-MCNC: NORMAL MG/DL
KETONES UR STRIP.AUTO-MCNC: NEGATIVE MG/DL
LEUKOCYTE ESTERASE UR QL STRIP.AUTO: ABNORMAL
MUCOUS THREADS #/AREA URNS AUTO: ABNORMAL /LPF
NITRITE UR QL STRIP.AUTO: ABNORMAL
PH UR STRIP.AUTO: 5.5 [PH]
POC APPEARANCE, URINE: CLEAR
POC BILIRUBIN, URINE: NEGATIVE
POC BLOOD, URINE: ABNORMAL
POC COLOR, URINE: YELLOW
POC GLUCOSE, URINE: NEGATIVE MG/DL
POC KETONES, URINE: NEGATIVE MG/DL
POC LEUKOCYTES, URINE: ABNORMAL
POC NITRITE,URINE: NEGATIVE
POC PH, URINE: 5.5 PH
POC PROTEIN, URINE: NEGATIVE MG/DL
POC SPECIFIC GRAVITY, URINE: 1.02
POC UROBILINOGEN, URINE: 0.2 EU/DL
PROT UR STRIP.AUTO-MCNC: ABNORMAL MG/DL
RBC # UR STRIP.AUTO: NEGATIVE /UL
RBC #/AREA URNS AUTO: ABNORMAL /HPF
SP GR UR STRIP.AUTO: 1.02
SQUAMOUS #/AREA URNS AUTO: ABNORMAL /HPF
UROBILINOGEN UR STRIP.AUTO-MCNC: NORMAL MG/DL
WBC #/AREA URNS AUTO: >50 /HPF

## 2024-07-29 PROCEDURE — 3079F DIAST BP 80-89 MM HG: CPT | Performed by: INTERNAL MEDICINE

## 2024-07-29 PROCEDURE — 3077F SYST BP >= 140 MM HG: CPT | Performed by: INTERNAL MEDICINE

## 2024-07-29 PROCEDURE — 1160F RVW MEDS BY RX/DR IN RCRD: CPT | Performed by: INTERNAL MEDICINE

## 2024-07-29 PROCEDURE — 81001 URINALYSIS AUTO W/SCOPE: CPT

## 2024-07-29 PROCEDURE — 99214 OFFICE O/P EST MOD 30 MIN: CPT | Performed by: INTERNAL MEDICINE

## 2024-07-29 PROCEDURE — 87086 URINE CULTURE/COLONY COUNT: CPT

## 2024-07-29 PROCEDURE — 1036F TOBACCO NON-USER: CPT | Performed by: INTERNAL MEDICINE

## 2024-07-29 PROCEDURE — 81003 URINALYSIS AUTO W/O SCOPE: CPT | Performed by: INTERNAL MEDICINE

## 2024-07-29 PROCEDURE — 1159F MED LIST DOCD IN RCRD: CPT | Performed by: INTERNAL MEDICINE

## 2024-07-29 ASSESSMENT — PATIENT HEALTH QUESTIONNAIRE - PHQ9
1. LITTLE INTEREST OR PLEASURE IN DOING THINGS: NOT AT ALL
SUM OF ALL RESPONSES TO PHQ9 QUESTIONS 1 AND 2: 0
2. FEELING DOWN, DEPRESSED OR HOPELESS: NOT AT ALL

## 2024-07-29 NOTE — PATIENT INSTRUCTIONS
Left sided headache after head injury  Sounds like trigeminal neuralgia but with head injury on coumadin, call [phone] to schedule ct head    Urine frequency, checking urine for infection    Mid thoracic pain is a muscle knot, but if blood in urine would get imaging of the kidneys due to history of kidney stones    History of blood clot with thick blood state  Continue warfarin  Contnue home monitoring, monthly    Hammertoes, leave them alone.     Cholesterol is well controlled    Prediabetes, stable, great job with exercise    Colonoscopy due in 2029  Bone density due in 2031, normal in 2021  Mammogram due in October, annual    Recommend RSV shot at the pharmacy    Ringing in the ears and hearing loss, if bother some consider hearing aides  And trial of background noise    Recheck in 6 months

## 2024-07-29 NOTE — PROGRESS NOTES
"Subjective   Patient ID: Diane Rao is a 78 y.o. female who presents for Follow-up.    She is getting out of the house more  She went to PT for her sciatica, they gave her exercises, she is walking now.     She goes to chair yoga and she is riding the bike and treadmill. She got a bruise on her left leg. She doesn't have any pain in the legs.     Her eyes have gotten worse  She sees Dr Bean     She gets a sharp pain in the left temple, for the last 6 months intermittently.   She gets optical migraines. No scalp sesnsitivity.     She has a feeling of ache/pain like a fist under her shoulder blade on the left side  She has been urinating more at night  Doing the kegels she has better control.   She had part of her kidney removed on the right side due to stones in the past.    Her toes are still bent. More on the left side.   Her under arm fungus is gone.     She has ringing in ears, it is like borja frying noise. Her hearing is not as good as it used to be,   She had a hearing test and they found some hearing loss, but she is not ready yet.          Review of Systems    Objective   /85   Pulse 64   Ht 1.6 m (5' 3\")   Wt 80.6 kg (177 lb 12.8 oz)   BMI 31.50 kg/m²     Physical Exam  Constitutional:       Appearance: Normal appearance.   HENT:      Head:      Comments: No scalp tenderness or rash     Ears:      Comments: Scant wax in b/l EAC     Nose: Nose normal.   Neck:      Vascular: No carotid bruit.   Cardiovascular:      Rate and Rhythm: Normal rate and regular rhythm.      Heart sounds: Murmur heard.   Pulmonary:      Effort: Pulmonary effort is normal.      Breath sounds: Normal breath sounds.   Abdominal:      Palpations: Abdomen is soft. There is no mass.      Tenderness: There is no abdominal tenderness.   Musculoskeletal:      Right lower leg: No edema.      Left lower leg: No edema.      Comments: Ropy muscle knots in the midthoracic paraspinals around T4-7, more on the left    No cva " tenderness   Lymphadenopathy:      Cervical: No cervical adenopathy.   Skin:     Coloration: Skin is not jaundiced or pale.   Neurological:      Mental Status: She is alert and oriented to person, place, and time.   Psychiatric:         Mood and Affect: Mood normal.         Assessment/Plan          Patient Instructions   Left sided headache after head injury  Sounds like trigeminal neuralgia but with head injury on coumadin, call [phone] to schedule ct head    Urine frequency, checking urine for infection    Mid thoracic pain is a muscle knot, but if blood in urine would get imaging of the kidneys due to history of kidney stones    History of blood clot with thick blood state  Continue warfarin  Contnue home monitoring, monthly    Hammertoes, leave them alone.     Cholesterol is well controlled    Prediabetes, stable, great job with exercise    Colonoscopy due in 2029  Bone density due in 2031, normal in 2021  Mammogram due in October, annual    Recommend RSV shot at the pharmacy    Ringing in the ears and hearing loss, if bother some consider hearing aides  And trial of background noise    Recheck in 6 months

## 2024-07-30 LAB — BACTERIA UR CULT: ABNORMAL

## 2024-08-05 ENCOUNTER — HOSPITAL ENCOUNTER (OUTPATIENT)
Dept: RADIOLOGY | Facility: HOSPITAL | Age: 79
Discharge: HOME | End: 2024-08-05
Payer: MEDICARE

## 2024-08-05 ENCOUNTER — LAB (OUTPATIENT)
Dept: LAB | Facility: LAB | Age: 79
End: 2024-08-05
Payer: MEDICARE

## 2024-08-05 DIAGNOSIS — R35.0 URINE FREQUENCY: ICD-10-CM

## 2024-08-05 DIAGNOSIS — S09.90XA CLOSED HEAD INJURY, INITIAL ENCOUNTER: ICD-10-CM

## 2024-08-05 DIAGNOSIS — R51.9 LEFT-SIDED HEADACHE: ICD-10-CM

## 2024-08-05 PROCEDURE — 87186 SC STD MICRODIL/AGAR DIL: CPT

## 2024-08-05 PROCEDURE — 70450 CT HEAD/BRAIN W/O DYE: CPT

## 2024-08-05 PROCEDURE — 87086 URINE CULTURE/COLONY COUNT: CPT

## 2024-08-05 PROCEDURE — 70450 CT HEAD/BRAIN W/O DYE: CPT | Performed by: RADIOLOGY

## 2024-08-08 LAB — BACTERIA UR CULT: ABNORMAL

## 2024-08-12 DIAGNOSIS — N30.00 ACUTE CYSTITIS WITHOUT HEMATURIA: Primary | ICD-10-CM

## 2024-08-12 RX ORDER — NITROFURANTOIN 25; 75 MG/1; MG/1
100 CAPSULE ORAL 2 TIMES DAILY
Qty: 14 CAPSULE | Refills: 0 | Status: SHIPPED | OUTPATIENT
Start: 2024-08-12 | End: 2024-08-19

## 2024-08-26 DIAGNOSIS — E78.00 PURE HYPERCHOLESTEROLEMIA: ICD-10-CM

## 2024-08-26 DIAGNOSIS — I82.519 CHRONIC DEEP VEIN THROMBOSIS (DVT) OF FEMORAL VEIN, UNSPECIFIED LATERALITY (MULTI): ICD-10-CM

## 2024-08-26 DIAGNOSIS — I10 ESSENTIAL HYPERTENSION: ICD-10-CM

## 2024-08-26 RX ORDER — WARFARIN SODIUM 2.5 MG/1
TABLET ORAL
Qty: 78 TABLET | Refills: 3 | Status: SHIPPED | OUTPATIENT
Start: 2024-08-26 | End: 2024-08-26 | Stop reason: SDUPTHER

## 2024-08-27 RX ORDER — WARFARIN 2.5 MG/1
TABLET ORAL
Qty: 78 TABLET | Refills: 1 | Status: SHIPPED | OUTPATIENT
Start: 2024-08-27

## 2024-09-05 DIAGNOSIS — R82.81 PYURIA: ICD-10-CM

## 2024-09-06 ENCOUNTER — LAB (OUTPATIENT)
Dept: LAB | Facility: LAB | Age: 79
End: 2024-09-06
Payer: MEDICARE

## 2024-09-06 DIAGNOSIS — R82.81 PYURIA: ICD-10-CM

## 2024-09-06 PROCEDURE — 87186 SC STD MICRODIL/AGAR DIL: CPT

## 2024-09-06 PROCEDURE — 87086 URINE CULTURE/COLONY COUNT: CPT

## 2024-09-08 LAB — BACTERIA UR CULT: ABNORMAL

## 2024-09-09 DIAGNOSIS — N30.00 ACUTE CYSTITIS WITHOUT HEMATURIA: Primary | ICD-10-CM

## 2024-09-09 LAB — BACTERIA UR CULT: ABNORMAL

## 2024-09-09 RX ORDER — NITROFURANTOIN 25; 75 MG/1; MG/1
100 CAPSULE ORAL 2 TIMES DAILY
Qty: 20 CAPSULE | Refills: 0 | Status: SHIPPED | OUTPATIENT
Start: 2024-09-09 | End: 2024-09-19

## 2024-09-13 DIAGNOSIS — I82.519 CHRONIC DEEP VEIN THROMBOSIS (DVT) OF FEMORAL VEIN, UNSPECIFIED LATERALITY (MULTI): ICD-10-CM

## 2024-09-13 DIAGNOSIS — I10 ESSENTIAL HYPERTENSION: ICD-10-CM

## 2024-09-13 DIAGNOSIS — E78.00 PURE HYPERCHOLESTEROLEMIA: ICD-10-CM

## 2024-09-13 RX ORDER — AMLODIPINE BESYLATE 5 MG/1
5 TABLET ORAL DAILY
Qty: 90 TABLET | Refills: 1 | Status: SHIPPED | OUTPATIENT
Start: 2024-09-13

## 2024-09-13 RX ORDER — ATORVASTATIN CALCIUM 40 MG/1
40 TABLET, FILM COATED ORAL DAILY
Qty: 90 TABLET | Refills: 1 | Status: SHIPPED | OUTPATIENT
Start: 2024-09-13

## 2024-09-13 RX ORDER — METOPROLOL TARTRATE 25 MG/1
25 TABLET, FILM COATED ORAL 2 TIMES DAILY
Qty: 180 TABLET | Refills: 1 | Status: SHIPPED | OUTPATIENT
Start: 2024-09-13

## 2024-09-20 ENCOUNTER — APPOINTMENT (OUTPATIENT)
Dept: PRIMARY CARE | Facility: CLINIC | Age: 79
End: 2024-09-20
Payer: MEDICARE

## 2024-09-20 VITALS
SYSTOLIC BLOOD PRESSURE: 134 MMHG | HEIGHT: 64 IN | OXYGEN SATURATION: 95 % | BODY MASS INDEX: 30.39 KG/M2 | WEIGHT: 178 LBS | TEMPERATURE: 96.8 F | DIASTOLIC BLOOD PRESSURE: 82 MMHG | HEART RATE: 68 BPM

## 2024-09-20 DIAGNOSIS — E78.2 MIXED HYPERLIPIDEMIA: ICD-10-CM

## 2024-09-20 DIAGNOSIS — I10 ESSENTIAL HYPERTENSION: ICD-10-CM

## 2024-09-20 DIAGNOSIS — N30.00 ACUTE CYSTITIS WITHOUT HEMATURIA: ICD-10-CM

## 2024-09-20 DIAGNOSIS — Z12.31 BREAST CANCER SCREENING BY MAMMOGRAM: Primary | ICD-10-CM

## 2024-09-20 PROBLEM — F90.9 ATTENTION DEFICIT DISORDER WITH HYPERACTIVITY: Status: RESOLVED | Noted: 2023-02-08 | Resolved: 2024-09-20

## 2024-09-20 PROBLEM — M54.50 LOW BACK PAIN: Status: RESOLVED | Noted: 2023-02-08 | Resolved: 2024-09-20

## 2024-09-20 PROBLEM — M54.6 LOWER THORACIC BACK PAIN: Status: RESOLVED | Noted: 2023-02-08 | Resolved: 2024-09-20

## 2024-09-20 PROBLEM — H81.11 BENIGN PAROXYSMAL POSITIONAL VERTIGO OF RIGHT EAR: Status: RESOLVED | Noted: 2023-10-21 | Resolved: 2024-09-20

## 2024-09-20 PROBLEM — H61.21 IMPACTED CERUMEN OF RIGHT EAR: Status: RESOLVED | Noted: 2023-02-08 | Resolved: 2024-09-20

## 2024-09-20 PROCEDURE — 99214 OFFICE O/P EST MOD 30 MIN: CPT | Performed by: FAMILY MEDICINE

## 2024-09-20 NOTE — PROGRESS NOTES
Subjective   Patient ID: Diane Rao is a 78 y.o. female.    Patient comes in today to establish with me.  She has a history of hypertension, dyslipidemia on statin.  History of DVT and lupus anticoagulant disorder.  History of vertigo, hypothyroid and prediabetes.  Has nonalcoholic fatty liver.  She has anxiety and is on chronic benzodiazepines that apparently I am not taking over prescription.  She is also on Adderall.  She is on warfarin.  She does not smoke or drink.  BMI is 30.  She is very happily .  Last labs were in July and reviewed.  Thyroid was normal.  A1c 5.9%.  She had a few bouts of UTI.  We discussed rechecking.  She is on antibiotics now.        Review of Systems   Constitutional:  Negative for fatigue, fever and unexpected weight change.   HENT:  Negative for congestion, ear pain, hearing loss, sore throat and trouble swallowing.    Eyes:  Negative for pain and visual disturbance.   Respiratory:  Negative for cough and shortness of breath.    Cardiovascular:  Negative for chest pain, palpitations and leg swelling.   Gastrointestinal:  Negative for abdominal pain, blood in stool, diarrhea, nausea and vomiting.   Genitourinary:  Positive for dysuria. Negative for frequency, hematuria and urgency.   Musculoskeletal:  Negative for joint swelling.   Skin:  Negative for pallor and rash.   Neurological:  Negative for dizziness, syncope, weakness, numbness and headaches.   Psychiatric/Behavioral:  Negative for confusion, decreased concentration, hallucinations and suicidal ideas.      Vitals:    09/20/24 1102   BP: 134/82   Pulse: 68   Temp: 36 °C (96.8 °F)   SpO2: 95%      Objective   Physical Exam  Constitutional:       Appearance: Normal appearance. She is obese.   HENT:      Head: Normocephalic and atraumatic.      Right Ear: Tympanic membrane and external ear normal.      Left Ear: Tympanic membrane and external ear normal.      Nose: Nose normal.      Mouth/Throat:      Mouth: Mucous  membranes are moist.      Pharynx: Oropharynx is clear. No oropharyngeal exudate.   Eyes:      Extraocular Movements: Extraocular movements intact.      Conjunctiva/sclera: Conjunctivae normal.      Pupils: Pupils are equal, round, and reactive to light.   Cardiovascular:      Rate and Rhythm: Normal rate and regular rhythm.      Heart sounds: Normal heart sounds.   Pulmonary:      Effort: Pulmonary effort is normal.      Breath sounds: Normal breath sounds.   Abdominal:      General: Abdomen is flat.      Palpations: Abdomen is soft. There is no mass.      Tenderness: There is no abdominal tenderness. There is no guarding.   Musculoskeletal:      Cervical back: Neck supple.   Lymphadenopathy:      Cervical: No cervical adenopathy.   Skin:     General: Skin is warm and dry.   Neurological:      General: No focal deficit present.      Mental Status: She is alert.   Psychiatric:         Mood and Affect: Mood normal.         Speech: Speech normal.         Behavior: Behavior normal.         Cognition and Memory: Cognition normal.         Assessment/Plan   Diagnoses and all orders for this visit:  Breast cancer screening by mammogram  -     BI mammo bilateral screening tomosynthesis; Future

## 2024-09-23 ASSESSMENT — ENCOUNTER SYMPTOMS
WEAKNESS: 0
FEVER: 0
TROUBLE SWALLOWING: 0
BLOOD IN STOOL: 0
DIARRHEA: 0
UNEXPECTED WEIGHT CHANGE: 0
FATIGUE: 0
DIZZINESS: 0
JOINT SWELLING: 0
NAUSEA: 0
SORE THROAT: 0
CONFUSION: 0
HALLUCINATIONS: 0
EYE PAIN: 0
DECREASED CONCENTRATION: 0
COUGH: 0
VOMITING: 0
PALPITATIONS: 0
ABDOMINAL PAIN: 0
FREQUENCY: 0
DYSURIA: 1
HEADACHES: 0
HEMATURIA: 0
NUMBNESS: 0
SHORTNESS OF BREATH: 0

## 2024-09-23 NOTE — PATIENT INSTRUCTIONS
It was nice to see you today!  Discussed current concerns and addressed   Reviewed medical history, social history and medications.  Reviewed recent labs and diagnostics  Reviewed medications list  Continue to eat a healthy diet, exercise at least 3 times a week or more  Plan and follow up discussed  For any further information related to your condition, copy and paste or go to [email or web address]

## 2024-10-02 ENCOUNTER — LAB (OUTPATIENT)
Dept: LAB | Facility: LAB | Age: 79
End: 2024-10-02
Payer: MEDICARE

## 2024-10-02 DIAGNOSIS — N30.00 ACUTE CYSTITIS WITHOUT HEMATURIA: ICD-10-CM

## 2024-10-02 LAB
APPEARANCE UR: ABNORMAL
BILIRUB UR STRIP.AUTO-MCNC: NEGATIVE MG/DL
COLOR UR: YELLOW
GLUCOSE UR STRIP.AUTO-MCNC: NORMAL MG/DL
KETONES UR STRIP.AUTO-MCNC: NEGATIVE MG/DL
LEUKOCYTE ESTERASE UR QL STRIP.AUTO: ABNORMAL
NITRITE UR QL STRIP.AUTO: ABNORMAL
PH UR STRIP.AUTO: 6 [PH]
PROT UR STRIP.AUTO-MCNC: ABNORMAL MG/DL
RBC # UR STRIP.AUTO: ABNORMAL /UL
RBC #/AREA URNS AUTO: NORMAL /HPF
SP GR UR STRIP.AUTO: 1.02
UROBILINOGEN UR STRIP.AUTO-MCNC: NORMAL MG/DL
WBC #/AREA URNS AUTO: NORMAL /HPF

## 2024-10-02 PROCEDURE — 81001 URINALYSIS AUTO W/SCOPE: CPT

## 2024-10-04 ENCOUNTER — DOCUMENTATION (OUTPATIENT)
Dept: PRIMARY CARE | Facility: CLINIC | Age: 79
End: 2024-10-04
Payer: MEDICARE

## 2024-10-04 NOTE — PROGRESS NOTES
Pt INR 3.9  Is currently on warfarin 2.5mg 6 days a week and skips 1 day on Friday.   Cut to 2.5mg 5 days a week, none on Tuesday or Friday  Recheck in 2 weeks

## 2024-10-09 ENCOUNTER — APPOINTMENT (OUTPATIENT)
Dept: BEHAVIORAL HEALTH | Facility: CLINIC | Age: 79
End: 2024-10-09
Payer: MEDICARE

## 2024-10-09 DIAGNOSIS — F41.1 GAD (GENERALIZED ANXIETY DISORDER): ICD-10-CM

## 2024-10-09 DIAGNOSIS — F33.41 RECURRENT MAJOR DEPRESSIVE DISORDER, IN PARTIAL REMISSION (CMS-HCC): ICD-10-CM

## 2024-10-09 DIAGNOSIS — F90.9 ATTENTION DEFICIT DISORDER WITH HYPERACTIVITY: ICD-10-CM

## 2024-10-09 DIAGNOSIS — F98.8 ATTENTION DEFICIT DISORDER WITHOUT HYPERACTIVITY: ICD-10-CM

## 2024-10-09 PROCEDURE — 99214 OFFICE O/P EST MOD 30 MIN: CPT | Performed by: PSYCHIATRY & NEUROLOGY

## 2024-10-09 RX ORDER — DEXTROAMPHETAMINE SACCHARATE, AMPHETAMINE ASPARTATE, DEXTROAMPHETAMINE SULFATE AND AMPHETAMINE SULFATE 2.5; 2.5; 2.5; 2.5 MG/1; MG/1; MG/1; MG/1
10 TABLET ORAL 3 TIMES DAILY
Qty: 270 TABLET | Refills: 0 | Status: SHIPPED | OUTPATIENT
Start: 2024-10-09 | End: 2025-01-07

## 2024-10-09 RX ORDER — FLUOXETINE 20 MG/1
20 TABLET ORAL 2 TIMES DAILY
Qty: 180 TABLET | Refills: 1 | Status: SHIPPED | OUTPATIENT
Start: 2024-10-09 | End: 2025-04-07

## 2024-10-09 NOTE — PROGRESS NOTES
"Outpatient Psychiatry - Med Management Followup      Location of service:  ___ Office         ___ A/V            __x_Telephone (3 factor ID completed)           Subjective   Diane Rao, a 79 y.o. female, for followup visit.           Assessment/Plan   Problem List Items Addressed This Visit               ICD-10-CM     Major depression, recurrent (CMS/HCC) F33.9     Attention deficit disorder without hyperactivity F98.8     GLEN (generalized anxiety disorder) F41.1         Treatment Plan/Recommendations:   Continue current medications and support  Followup in 3 months  Needs Adderall renewed and  Prozac.  Has only used 2 Lorazepam in past 3 months.        Referral to:  NA     Review with patient:   Follow-up plan was discussed with patient.  Treatment plan reviewed with the patient.  Medication risks/benefit reviewed with the patient     HPI:    Doing well:  Continues sobriety, happy in marriage, getting out and exercising.   Denies panic attacks, attentional difficulties, significant depressive symptoms at this time.   Is concerned that new PCP may think she is dependent on benzo's - initial note says \"chronic benzodiazepines...\" but in fact uses sparingling and still has original prescription from 6 months ago that may last the rest of her life.   No issues of concern today.     Current Medications:    FLUoxetine (PROzac) 20 mg capsule, Take 1 capsule (20 mg) by mouth 2 times a day     amphetamine-dextroamphetamine (Adderall) 10 mg tablet, Take 1 tablet (10 mg) by mouth 3 times a day     LORazepam (Ativan) 0.5 mg tablet, Take 1 tablet (0.5 mg) by mouth every 8 hours if needed for anxiety     Interval Medical History:  Not significant - no medication changes, no hospitalizations or major evenets     Psychiatric Review Of Systems:   Depressive Symptoms: not significant at this time  Manic Symptoms: NA  Anxiety Symptoms: reduced with medication at this time  Disordered Eating Symptoms: NA  Inattentive Symptoms: " "improved with medication  Hyperactive/Impulsive Symptoms: NA  Trauma Symptoms: NA  Conduct Issues: NA  Psychotic Symptoms: NA  Developmental Concerns: NA  Other Symptoms/Concerns:NA  Delirium/Altered Mental Status Symptoms: NA              Objective   Mental Status Exam:  Patient is a well nourished, well developed white female  appearing to be approximately stated age  Appearance:   Well groomed, appropriately dressed   Attention:  Oriented X 3  Speech:         Form:  fluent, coherent, relevant, organized; evidence for formal thought disorder       Process:  abstract; without abnormal associations       Content:  without hallucinations, delusions,  denies suicidal or homicidal ideation  Movements: No apparent gait disturbance or abnormal involuntary movements of face or trunk  Mood:  \"Good\"  Affect:  Euthymic  Cognition:   Grossly intact and appropriate to level of educational attainment  Judgement:   Good  Insight:   Good         Vitals:  There were no vitals filed for this visit.        Total time spent 30\" with patient; at least 50% of time spent in counseling, explanation of diagnosis, planning of further management, and coordination of care     Jeff Burk MD MPH  "

## 2024-10-14 ENCOUNTER — HOSPITAL ENCOUNTER (OUTPATIENT)
Dept: RADIOLOGY | Facility: HOSPITAL | Age: 79
Discharge: HOME | End: 2024-10-14
Payer: MEDICARE

## 2024-10-14 VITALS — WEIGHT: 178 LBS | HEIGHT: 64 IN | BODY MASS INDEX: 30.39 KG/M2

## 2024-10-14 DIAGNOSIS — Z12.31 BREAST CANCER SCREENING BY MAMMOGRAM: ICD-10-CM

## 2024-10-14 PROCEDURE — 77063 BREAST TOMOSYNTHESIS BI: CPT | Performed by: RADIOLOGY

## 2024-10-14 PROCEDURE — 77067 SCR MAMMO BI INCL CAD: CPT | Performed by: RADIOLOGY

## 2024-10-14 PROCEDURE — 77067 SCR MAMMO BI INCL CAD: CPT

## 2024-10-15 ENCOUNTER — TELEPHONE (OUTPATIENT)
Dept: PRIMARY CARE | Facility: CLINIC | Age: 79
End: 2024-10-15
Payer: MEDICARE

## 2024-10-15 NOTE — TELEPHONE ENCOUNTER
Pt INR called establish a primary physician for pt. They faxed over a audit form to be filled out for pt records

## 2024-10-16 NOTE — TELEPHONE ENCOUNTER
I had to call [facility]by to have the audit recent the fax machine was down but I confirmed and told them to resend paperwork so it can be signed. Paperwork will be on your desk when received

## 2024-10-17 ENCOUNTER — APPOINTMENT (OUTPATIENT)
Dept: PRIMARY CARE | Facility: CLINIC | Age: 79
End: 2024-10-17
Payer: MEDICARE

## 2024-11-22 ENCOUNTER — APPOINTMENT (OUTPATIENT)
Dept: PRIMARY CARE | Facility: CLINIC | Age: 79
End: 2024-11-22
Payer: MEDICARE

## 2024-11-22 VITALS
OXYGEN SATURATION: 98 % | TEMPERATURE: 95.7 F | BODY MASS INDEX: 30.38 KG/M2 | SYSTOLIC BLOOD PRESSURE: 110 MMHG | HEART RATE: 68 BPM | DIASTOLIC BLOOD PRESSURE: 70 MMHG | WEIGHT: 177 LBS

## 2024-11-22 DIAGNOSIS — R39.9 UTI SYMPTOMS: ICD-10-CM

## 2024-11-22 LAB
POC APPEARANCE, URINE: ABNORMAL
POC BILIRUBIN, URINE: NEGATIVE
POC BLOOD, URINE: ABNORMAL
POC COLOR, URINE: ABNORMAL
POC GLUCOSE, URINE: NEGATIVE MG/DL
POC KETONES, URINE: NEGATIVE MG/DL
POC LEUKOCYTES, URINE: ABNORMAL
POC NITRITE,URINE: POSITIVE
POC PH, URINE: 6 PH
POC PROTEIN, URINE: ABNORMAL MG/DL
POC SPECIFIC GRAVITY, URINE: >=1.03
POC UROBILINOGEN, URINE: 0.2 EU/DL

## 2024-11-22 PROCEDURE — 87186 SC STD MICRODIL/AGAR DIL: CPT

## 2024-11-22 PROCEDURE — 1160F RVW MEDS BY RX/DR IN RCRD: CPT | Performed by: FAMILY MEDICINE

## 2024-11-22 PROCEDURE — 99213 OFFICE O/P EST LOW 20 MIN: CPT | Performed by: FAMILY MEDICINE

## 2024-11-22 PROCEDURE — 87086 URINE CULTURE/COLONY COUNT: CPT

## 2024-11-22 PROCEDURE — 3074F SYST BP LT 130 MM HG: CPT | Performed by: FAMILY MEDICINE

## 2024-11-22 PROCEDURE — 1159F MED LIST DOCD IN RCRD: CPT | Performed by: FAMILY MEDICINE

## 2024-11-22 PROCEDURE — 3078F DIAST BP <80 MM HG: CPT | Performed by: FAMILY MEDICINE

## 2024-11-22 PROCEDURE — 81003 URINALYSIS AUTO W/O SCOPE: CPT | Performed by: FAMILY MEDICINE

## 2024-11-22 RX ORDER — CIPROFLOXACIN 250 MG/1
250 TABLET, FILM COATED ORAL 2 TIMES DAILY
Qty: 20 TABLET | Refills: 0 | Status: SHIPPED | OUTPATIENT
Start: 2024-11-22 | End: 2024-12-02

## 2024-11-24 LAB — BACTERIA UR CULT: ABNORMAL

## 2024-11-25 LAB — BACTERIA UR CULT: ABNORMAL

## 2024-11-25 ASSESSMENT — ENCOUNTER SYMPTOMS
ABDOMINAL PAIN: 0
EYE PAIN: 0
DIARRHEA: 0
NAUSEA: 0
UNEXPECTED WEIGHT CHANGE: 0
COUGH: 0
BLOOD IN STOOL: 0
PALPITATIONS: 0
VOMITING: 0
DECREASED CONCENTRATION: 0
SORE THROAT: 0
DIZZINESS: 0
CONFUSION: 0
JOINT SWELLING: 0
FREQUENCY: 1
FEVER: 0
TROUBLE SWALLOWING: 0
HEADACHES: 0
HALLUCINATIONS: 0
WEAKNESS: 0
HEMATURIA: 0
DYSURIA: 1
SHORTNESS OF BREATH: 0
NUMBNESS: 0
FATIGUE: 0

## 2024-11-25 NOTE — PROGRESS NOTES
Subjective   Patient ID: Diane Rao is a 79 y.o. female.    Patient has had a few UTIs over the last 2 months.  She was treated with Macrobid.  She has symptoms again of frequency and burning with urination.  No fever, chills, night sweats, flank pain or nausea.        Review of Systems   Constitutional:  Negative for fatigue, fever and unexpected weight change.   HENT:  Negative for congestion, ear pain, hearing loss, sore throat and trouble swallowing.    Eyes:  Negative for pain and visual disturbance.   Respiratory:  Negative for cough and shortness of breath.    Cardiovascular:  Negative for chest pain, palpitations and leg swelling.   Gastrointestinal:  Negative for abdominal pain, blood in stool, diarrhea, nausea and vomiting.   Genitourinary:  Positive for dysuria, frequency and urgency. Negative for hematuria.   Musculoskeletal:  Negative for joint swelling.   Skin:  Negative for pallor and rash.   Neurological:  Negative for dizziness, syncope, weakness, numbness and headaches.   Psychiatric/Behavioral:  Negative for confusion, decreased concentration, hallucinations and suicidal ideas.      Vitals:    11/22/24 1431   BP: 110/70   Pulse: 68   Temp: 35.4 °C (95.7 °F)   SpO2: 98%      Objective   Physical Exam  Constitutional:       Appearance: Normal appearance.   Cardiovascular:      Rate and Rhythm: Normal rate and regular rhythm.   Pulmonary:      Effort: Pulmonary effort is normal. No respiratory distress.      Breath sounds: Normal breath sounds.   Abdominal:      General: Abdomen is flat. Bowel sounds are normal.      Palpations: Abdomen is soft.      Tenderness: There is no right CVA tenderness or left CVA tenderness.   Skin:     General: Skin is warm and dry.   Neurological:      General: No focal deficit present.      Mental Status: She is alert.         Assessment/Plan   Diagnoses and all orders for this visit:  UTI symptoms  -     POCT UA Automated manually resulted  -     ciprofloxacin  (Cipro) 250 mg tablet; Take 1 tablet (250 mg) by mouth 2 times a day for 10 days.  -     Urine Culture    Urinalysis is suspicious for another infection.  Will get a culture at this time and switch to Cipro.  Water only, avoid bladder irritants or sexual activity

## 2024-12-06 DIAGNOSIS — E03.9 ACQUIRED HYPOTHYROIDISM: ICD-10-CM

## 2024-12-06 RX ORDER — LEVOTHYROXINE SODIUM 50 UG/1
50 TABLET ORAL DAILY
Qty: 90 TABLET | Refills: 1 | Status: SHIPPED | OUTPATIENT
Start: 2024-12-06

## 2025-01-06 ENCOUNTER — APPOINTMENT (OUTPATIENT)
Dept: PRIMARY CARE | Facility: CLINIC | Age: 80
End: 2025-01-06
Payer: MEDICARE

## 2025-01-08 ENCOUNTER — APPOINTMENT (OUTPATIENT)
Dept: BEHAVIORAL HEALTH | Facility: CLINIC | Age: 80
End: 2025-01-08
Payer: MEDICARE

## 2025-01-08 DIAGNOSIS — F33.41 RECURRENT MAJOR DEPRESSIVE DISORDER, IN PARTIAL REMISSION (CMS-HCC): ICD-10-CM

## 2025-01-08 DIAGNOSIS — F41.1 GAD (GENERALIZED ANXIETY DISORDER): ICD-10-CM

## 2025-01-08 DIAGNOSIS — F98.8 ATTENTION DEFICIT DISORDER WITHOUT HYPERACTIVITY: ICD-10-CM

## 2025-01-08 PROCEDURE — G2211 COMPLEX E/M VISIT ADD ON: HCPCS | Performed by: PSYCHIATRY & NEUROLOGY

## 2025-01-08 PROCEDURE — 99214 OFFICE O/P EST MOD 30 MIN: CPT | Performed by: PSYCHIATRY & NEUROLOGY

## 2025-01-08 RX ORDER — DEXTROAMPHETAMINE SACCHARATE, AMPHETAMINE ASPARTATE, DEXTROAMPHETAMINE SULFATE AND AMPHETAMINE SULFATE 2.5; 2.5; 2.5; 2.5 MG/1; MG/1; MG/1; MG/1
10 TABLET ORAL 3 TIMES DAILY
Qty: 270 TABLET | Refills: 0 | Status: SHIPPED | OUTPATIENT
Start: 2025-01-08 | End: 2025-04-08

## 2025-01-08 NOTE — PROGRESS NOTES
"Outpatient Psychiatry - Med Management Followup      Location of service:  ___ Office         ___ A/V            __x_Telephone (3 factor ID completed)           Subjective   Diane Rao, a 79 y.o. female, for followup visit.           Assessment/Plan   Problem List Items Addressed This Visit               ICD-10-CM     Major depression, recurrent (CMS/HCC) F33.9     Attention deficit disorder without hyperactivity F98.8     GLEN (generalized anxiety disorder) F41.1         Treatment Plan/Recommendations:   Continue current medications and support  Followup in 3 months  Needs Adderall renewed; has Prozac through April and  Has only used 2 Lorazepam in past 3 months.        Referral to:  NA     Review with patient:   Follow-up plan was discussed with patient.  Treatment plan reviewed with the patient.  Medication risks/benefit reviewed with the patient     HPI:    Doing well:  Continues sobriety, happy in marriage.  Tells me \"a lot has been going on,\"  her son Diego was discovered to have a \"hole in his heart,\" and underwent successful repair - the good news is that he has had migraine headaches his whole life and they have stopped - apparently there is a correlation noted in the literature with this issue.   Next her  went for cardiac screening and now is scheduled for Echo, catheterization and possible stenting later this month:  He is a weight  and has been told he can't lift in the meantime; again good news that this was caught before a heart attack.  Her son-in-law was just hospitalized today for Guillian-Luebbering syndrome; unclear what the cause was but she notes that Pf[employer] has issued a warning that this can be a complication of the  RSV vaccine (she doesn't know if he was vaccinated) - again good news is she's been vaccinated and had no problems.   Also has learned she shouldn't hot tub any more because of getting a UTI that has been hard to eradicate, again likely from the hot tub.   There are " "some family stresses:   After his positive cardiac screen his son has asked him to tell them what \"arrangements\" they should make in the case of his death; this is appropriate of course but she felt she was excluded from the discussion and had to \"push\" her way in.. She and her  are going this Friday to a  home to begin a discussion about arrangments, and I shared my opinion that making these arrangements ahead of time themselves is the greatest gift they can give their survivors...  Otherwise everything is great, no concerns about her emotional health, notes this is the longest she's ever gone without needing to make changes to her regimen after I suggest at next visit we start to discuss lowering or eliminating her Adderal due to the health risks for older patients.  I affirmed my intention was to go slow and thoughtfully, but that this is probably an important thing to do now, and she understands.     Current Medications:    FLUoxetine (PROzac) 20 mg capsule, Take 1 capsule (20 mg) by mouth 2 times a day  - has refill    amphetamine-dextroamphetamine (Adderall) 10 mg tablet, Take 1 tablet (10 mg) by mouth 3 times a day - needs refills - pharmacy now allows her 90 day supply.    LORazepam (Ativan) 0.5 mg tablet, Take 1 tablet (0.5 mg) by mouth every 8 hours if needed for anxiety-does not need \"until  probably!\"  Only uses on rare occasions.     Interval Medical History:  Not significant - no medication changes, no hospitalizations or major evenets     Psychiatric Review Of Systems:   Depressive Symptoms: not significant at this time  Manic Symptoms: NA  Anxiety Symptoms: reduced with medication at this time  Disordered Eating Symptoms: NA  Inattentive Symptoms: improved with medication  Hyperactive/Impulsive Symptoms: NA  Trauma Symptoms: NA  Conduct Issues: NA  Psychotic Symptoms: NA  Developmental Concerns: NA  Other Symptoms/Concerns:NA  Delirium/Altered Mental Status Symptoms: NA            " "  Objective   Mental Status Exam:  Patient is a well nourished, well developed white female  appearing to be approximately stated age  Appearance:   Well groomed, appropriately dressed   Attention:  Oriented X 3  Speech:         Form:  fluent, coherent, relevant, organized; evidence for formal thought disorder       Process:  abstract; without abnormal associations       Content:  without hallucinations, delusions,  denies suicidal or homicidal ideation  Movements: No apparent gait disturbance or abnormal involuntary movements of face or trunk  Mood:  \"Good\"  Affect:  Euthymic  Cognition:   Grossly intact and appropriate to level of educational attainment  Judgement:   Good  Insight:   Good         Vitals:  There were no vitals filed for this visit.        Total time spent 30\" with patient; at least 50% of time spent in counseling, explanation of diagnosis, planning of further management, and coordination of care     Jeff Burk MD MPH  "

## 2025-01-13 DIAGNOSIS — F98.8 ATTENTION DEFICIT DISORDER WITHOUT HYPERACTIVITY: ICD-10-CM

## 2025-01-13 DIAGNOSIS — F41.1 GAD (GENERALIZED ANXIETY DISORDER): ICD-10-CM

## 2025-01-13 DIAGNOSIS — F33.41 RECURRENT MAJOR DEPRESSIVE DISORDER, IN PARTIAL REMISSION (CMS-HCC): ICD-10-CM

## 2025-01-16 RX ORDER — DEXTROAMPHETAMINE SACCHARATE, AMPHETAMINE ASPARTATE, DEXTROAMPHETAMINE SULFATE AND AMPHETAMINE SULFATE 2.5; 2.5; 2.5; 2.5 MG/1; MG/1; MG/1; MG/1
10 TABLET ORAL 3 TIMES DAILY
Qty: 270 TABLET | Refills: 0 | Status: SHIPPED | OUTPATIENT
Start: 2025-01-16 | End: 2025-04-16

## 2025-01-16 RX ORDER — FLUOXETINE 20 MG/1
20 TABLET ORAL 2 TIMES DAILY
Qty: 180 TABLET | Refills: 1 | Status: SHIPPED | OUTPATIENT
Start: 2025-01-16 | End: 2025-07-15

## 2025-01-22 PROBLEM — Z86.59 HISTORY OF MENTAL DISORDER: Status: ACTIVE | Noted: 2025-01-22

## 2025-01-22 PROBLEM — N95.2 ATROPHIC VAGINITIS: Status: ACTIVE | Noted: 2025-01-22

## 2025-01-22 PROBLEM — G50.0 TRIGEMINAL NEURALGIA: Status: ACTIVE | Noted: 2025-01-22

## 2025-01-22 PROBLEM — I82.409 ACUTE DEEP VEIN THROMBOSIS (DVT) OF LOWER EXTREMITY (MULTI): Status: RESOLVED | Noted: 2025-01-22 | Resolved: 2025-01-22

## 2025-01-22 PROBLEM — K14.8 LESION OF TONGUE: Status: ACTIVE | Noted: 2025-01-22

## 2025-01-22 PROBLEM — M81.0 POST-MENOPAUSAL OSTEOPOROSIS: Status: ACTIVE | Noted: 2025-01-22

## 2025-01-22 PROBLEM — B02.9 HERPES ZOSTER: Status: ACTIVE | Noted: 2025-01-22

## 2025-01-22 PROBLEM — Z86.79 HISTORY OF HYPERTENSION: Status: ACTIVE | Noted: 2025-01-22

## 2025-01-22 PROBLEM — F90.0 ATTENTION DEFICIT HYPERACTIVITY DISORDER (ADHD), PREDOMINANTLY INATTENTIVE TYPE: Status: ACTIVE | Noted: 2025-01-22

## 2025-01-22 PROBLEM — M25.50 ARTHRALGIA: Status: ACTIVE | Noted: 2025-01-22

## 2025-01-24 ENCOUNTER — APPOINTMENT (OUTPATIENT)
Dept: PRIMARY CARE | Facility: CLINIC | Age: 80
End: 2025-01-24
Payer: MEDICARE

## 2025-01-24 VITALS
SYSTOLIC BLOOD PRESSURE: 130 MMHG | HEIGHT: 64 IN | HEART RATE: 68 BPM | WEIGHT: 180 LBS | DIASTOLIC BLOOD PRESSURE: 70 MMHG | OXYGEN SATURATION: 98 % | TEMPERATURE: 97 F | BODY MASS INDEX: 30.73 KG/M2

## 2025-01-24 DIAGNOSIS — Z00.00 ROUTINE GENERAL MEDICAL EXAMINATION AT HEALTH CARE FACILITY: ICD-10-CM

## 2025-01-24 DIAGNOSIS — R30.0 DYSURIA: ICD-10-CM

## 2025-01-24 DIAGNOSIS — E03.9 ACQUIRED HYPOTHYROIDISM: ICD-10-CM

## 2025-01-24 DIAGNOSIS — I10 ESSENTIAL HYPERTENSION: ICD-10-CM

## 2025-01-24 DIAGNOSIS — E78.00 PURE HYPERCHOLESTEROLEMIA: ICD-10-CM

## 2025-01-24 DIAGNOSIS — R73.09 ELEVATED GLUCOSE LEVEL: ICD-10-CM

## 2025-01-24 DIAGNOSIS — R73.01 IMPAIRED FASTING GLUCOSE: ICD-10-CM

## 2025-01-24 DIAGNOSIS — I82.519 CHRONIC DEEP VEIN THROMBOSIS (DVT) OF FEMORAL VEIN, UNSPECIFIED LATERALITY (MULTI): ICD-10-CM

## 2025-01-24 DIAGNOSIS — M25.362 INSTABILITY OF LEFT KNEE JOINT: Primary | ICD-10-CM

## 2025-01-24 LAB — POC HEMOGLOBIN A1C: 5.6 % (ref 4.2–6.5)

## 2025-01-24 PROCEDURE — 3075F SYST BP GE 130 - 139MM HG: CPT | Performed by: FAMILY MEDICINE

## 2025-01-24 PROCEDURE — G0439 PPPS, SUBSEQ VISIT: HCPCS | Performed by: FAMILY MEDICINE

## 2025-01-24 PROCEDURE — 83036 HEMOGLOBIN GLYCOSYLATED A1C: CPT | Performed by: FAMILY MEDICINE

## 2025-01-24 PROCEDURE — 1124F ACP DISCUSS-NO DSCNMKR DOCD: CPT | Performed by: FAMILY MEDICINE

## 2025-01-24 PROCEDURE — 1170F FXNL STATUS ASSESSED: CPT | Performed by: FAMILY MEDICINE

## 2025-01-24 PROCEDURE — 3078F DIAST BP <80 MM HG: CPT | Performed by: FAMILY MEDICINE

## 2025-01-24 PROCEDURE — 1126F AMNT PAIN NOTED NONE PRSNT: CPT | Performed by: FAMILY MEDICINE

## 2025-01-24 RX ORDER — AMLODIPINE BESYLATE 5 MG/1
5 TABLET ORAL DAILY
Qty: 90 TABLET | Refills: 1 | Status: SHIPPED | OUTPATIENT
Start: 2025-01-24

## 2025-01-24 RX ORDER — LEVOTHYROXINE SODIUM 50 UG/1
50 TABLET ORAL DAILY
Qty: 90 TABLET | Refills: 1 | Status: SHIPPED | OUTPATIENT
Start: 2025-01-24

## 2025-01-24 RX ORDER — WARFARIN 2.5 MG/1
TABLET ORAL
Qty: 78 TABLET | Refills: 1 | Status: SHIPPED | OUTPATIENT
Start: 2025-01-24

## 2025-01-24 RX ORDER — METOPROLOL TARTRATE 25 MG/1
25 TABLET, FILM COATED ORAL 2 TIMES DAILY
Qty: 180 TABLET | Refills: 1 | Status: SHIPPED | OUTPATIENT
Start: 2025-01-24

## 2025-01-24 ASSESSMENT — PATIENT HEALTH QUESTIONNAIRE - PHQ9
1. LITTLE INTEREST OR PLEASURE IN DOING THINGS: NOT AT ALL
2. FEELING DOWN, DEPRESSED OR HOPELESS: NOT AT ALL
SUM OF ALL RESPONSES TO PHQ9 QUESTIONS 1 AND 2: 0

## 2025-01-24 ASSESSMENT — ACTIVITIES OF DAILY LIVING (ADL)
MANAGING_FINANCES: INDEPENDENT
DRESSING: INDEPENDENT
GROCERY_SHOPPING: INDEPENDENT
BATHING: INDEPENDENT
TAKING_MEDICATION: INDEPENDENT
DOING_HOUSEWORK: INDEPENDENT

## 2025-01-24 ASSESSMENT — PAIN SCALES - GENERAL: PAINLEVEL_OUTOF10: 0-NO PAIN

## 2025-01-30 PROBLEM — Z86.79 HISTORY OF HYPERTENSION: Status: RESOLVED | Noted: 2025-01-22 | Resolved: 2025-01-30

## 2025-01-30 PROBLEM — M81.0 POSTMENOPAUSAL BONE LOSS: Status: RESOLVED | Noted: 2023-02-08 | Resolved: 2025-01-30

## 2025-01-30 PROBLEM — N95.2 POSTMENOPAUSAL ATROPHIC VAGINITIS: Status: RESOLVED | Noted: 2023-02-08 | Resolved: 2025-01-30

## 2025-01-30 PROBLEM — K14.8 LESION OF TONGUE: Status: RESOLVED | Noted: 2025-01-22 | Resolved: 2025-01-30

## 2025-01-30 PROBLEM — F98.8 ATTENTION DEFICIT DISORDER WITHOUT HYPERACTIVITY: Status: RESOLVED | Noted: 2023-10-21 | Resolved: 2025-01-30

## 2025-01-30 ASSESSMENT — ENCOUNTER SYMPTOMS
COUGH: 0
UNEXPECTED WEIGHT CHANGE: 0
PALPITATIONS: 0
VOMITING: 0
HALLUCINATIONS: 0
FATIGUE: 0
TROUBLE SWALLOWING: 0
NUMBNESS: 0
SHORTNESS OF BREATH: 0
SORE THROAT: 0
JOINT SWELLING: 0
FREQUENCY: 1
DIARRHEA: 0
DYSURIA: 0
DIZZINESS: 0
HEADACHES: 0
WEAKNESS: 0
DECREASED CONCENTRATION: 0
CONFUSION: 0
EYE PAIN: 0
NAUSEA: 0
BLOOD IN STOOL: 0
FEVER: 0
HEMATURIA: 0
ABDOMINAL PAIN: 0

## 2025-01-30 NOTE — PATIENT INSTRUCTIONS
It was nice to see you today!  Discussed current concerns and addressed   Reviewed recent labs and diagnostics  Reviewed medications list  Continue to eat a healthy diet, exercise at least 3 times a week or more  Plan and follow up discussed  For any further information related to your condition, copy and paste or go to [email or web address]  See orders  Culture urine

## 2025-01-30 NOTE — ASSESSMENT & PLAN NOTE
Orders:    warfarin (Jantoven) 2.5 mg tablet; Take 1 tablet once daily at bedtime except on Friday    amLODIPine (Norvasc) 5 mg tablet; Take 1 tablet (5 mg) by mouth once daily.    metoprolol tartrate (Lopressor) 25 mg tablet; Take 1 tablet (25 mg) by mouth 2 times a day.

## 2025-01-30 NOTE — PROGRESS NOTES
"Subjective   Reason for Visit: Diane Rao is an 79 y.o. female here for a Medicare Wellness visit.               Diane Rao comes in today for medicare wellness.  There has been no chest pain, shortness of breath, fever, chills, unexplained weight loss, rectal bleeding or any other unusual symptoms. Body mass index is 31.14 kg/m². A1C  5.6%.  Had UTI. Chronic frequency, no fever, nausea, back pain. Most recent labs, diagnostics and pertinent information has been reviewed. Will update if necessary.  Patient is attempting to eat a healthy diet and incorporate exercise in to their lifestyle. Patient does not smoke. Patient is practicing routine eye and dental care. Reviewed employment status. Reviewed current medications, if any. Immunizations reviewed and updated if appropriate.          Patient Care Team:  Elida Jacobson MD as PCP - General (Family Medicine)  Keyona Cam DO as PCP [facility]     Review of Systems   Constitutional:  Negative for fatigue, fever and unexpected weight change.   HENT:  Negative for congestion, ear pain, hearing loss, sore throat and trouble swallowing.    Eyes:  Negative for pain and visual disturbance.   Respiratory:  Negative for cough and shortness of breath.    Cardiovascular:  Negative for chest pain, palpitations and leg swelling.   Gastrointestinal:  Negative for abdominal pain, blood in stool, diarrhea, nausea and vomiting.   Genitourinary:  Positive for frequency. Negative for dysuria, hematuria and urgency.   Musculoskeletal:  Negative for joint swelling.   Skin:  Negative for pallor and rash.   Neurological:  Negative for dizziness, syncope, weakness, numbness and headaches.   Psychiatric/Behavioral:  Negative for confusion, decreased concentration, hallucinations and suicidal ideas.        Objective   Vitals:  /70   Pulse 68   Temp 36.1 °C (97 °F)   Ht 1.619 m (5' 3.75\")   Wt 81.6 kg (180 lb)   SpO2 98%   BMI 31.14 kg/m²   "     Physical Exam  Constitutional:       Appearance: Normal appearance.   Eyes:      Extraocular Movements: Extraocular movements intact.      Conjunctiva/sclera: Conjunctivae normal.      Pupils: Pupils are equal, round, and reactive to light.   Cardiovascular:      Rate and Rhythm: Normal rate and regular rhythm.      Heart sounds: Normal heart sounds.   Pulmonary:      Effort: Pulmonary effort is normal.      Breath sounds: Normal breath sounds.   Musculoskeletal:         General: No swelling. Normal range of motion.      Cervical back: Neck supple.      Right lower leg: No edema.      Left lower leg: No edema.   Skin:     General: Skin is warm and dry.   Neurological:      General: No focal deficit present.      Mental Status: She is alert.   Psychiatric:         Mood and Affect: Mood normal.         Speech: Speech normal.         Behavior: Behavior normal.         Cognition and Memory: Cognition normal.         Assessment & Plan  Elevated glucose level    Orders:    POCT glycosylated hemoglobin (Hb A1C) manually resulted    Pure hypercholesterolemia    Orders:    warfarin (Jantoven) 2.5 mg tablet; Take 1 tablet once daily at bedtime except on Friday    amLODIPine (Norvasc) 5 mg tablet; Take 1 tablet (5 mg) by mouth once daily.    metoprolol tartrate (Lopressor) 25 mg tablet; Take 1 tablet (25 mg) by mouth 2 times a day.    Essential hypertension    Orders:    warfarin (Jantoven) 2.5 mg tablet; Take 1 tablet once daily at bedtime except on Friday    amLODIPine (Norvasc) 5 mg tablet; Take 1 tablet (5 mg) by mouth once daily.    metoprolol tartrate (Lopressor) 25 mg tablet; Take 1 tablet (25 mg) by mouth 2 times a day.    Chronic deep vein thrombosis (DVT) of femoral vein, unspecified laterality (Multi)    Orders:    warfarin (Jantoven) 2.5 mg tablet; Take 1 tablet once daily at bedtime except on Friday    amLODIPine (Norvasc) 5 mg tablet; Take 1 tablet (5 mg) by mouth once daily.    metoprolol tartrate  (Lopressor) 25 mg tablet; Take 1 tablet (25 mg) by mouth 2 times a day.    Acquired hypothyroidism    Orders:    levothyroxine (Synthroid, Levoxyl) 50 mcg tablet; Take 1 tablet (50 mcg) by mouth once daily.    Instability of left knee joint    Orders:    MR knee left wo IV contrast; Future    Dysuria    Orders:    Urine Culture; Future    Routine general medical examination at health care facility    Orders:    1 Year Follow Up In Primary Care - Wellness Exam; Future    Impaired fasting glucose

## 2025-02-02 DIAGNOSIS — F98.8 ATTENTION DEFICIT DISORDER WITHOUT HYPERACTIVITY: ICD-10-CM

## 2025-02-02 LAB — BACTERIA UR CULT: ABNORMAL

## 2025-02-02 RX ORDER — DEXTROAMPHETAMINE SACCHARATE, AMPHETAMINE ASPARTATE, DEXTROAMPHETAMINE SULFATE AND AMPHETAMINE SULFATE 2.5; 2.5; 2.5; 2.5 MG/1; MG/1; MG/1; MG/1
10 TABLET ORAL 3 TIMES DAILY
Qty: 270 TABLET | Refills: 0 | Status: CANCELLED | OUTPATIENT
Start: 2025-02-02 | End: 2025-05-03

## 2025-02-03 DIAGNOSIS — N30.00 ACUTE CYSTITIS WITHOUT HEMATURIA: Primary | ICD-10-CM

## 2025-02-04 RX ORDER — CIPROFLOXACIN 250 MG/1
250 TABLET, FILM COATED ORAL 2 TIMES DAILY
Qty: 14 TABLET | Refills: 0 | Status: SHIPPED | OUTPATIENT
Start: 2025-02-04 | End: 2025-02-11

## 2025-02-06 DIAGNOSIS — F98.8 ATTENTION DEFICIT DISORDER WITHOUT HYPERACTIVITY: ICD-10-CM

## 2025-02-06 RX ORDER — DEXTROAMPHETAMINE SACCHARATE, AMPHETAMINE ASPARTATE, DEXTROAMPHETAMINE SULFATE AND AMPHETAMINE SULFATE 2.5; 2.5; 2.5; 2.5 MG/1; MG/1; MG/1; MG/1
10 TABLET ORAL 3 TIMES DAILY
Qty: 90 TABLET | Refills: 0 | Status: SHIPPED | OUTPATIENT
Start: 2025-02-06 | End: 2025-03-08

## 2025-02-06 NOTE — TELEPHONE ENCOUNTER
I received information from RN that [facility] pharmacy would not fill a 90 day prescription of Adderall 10mg tid, so prescription rewritten today for 30 day supply and sent to [facility].

## 2025-02-14 ENCOUNTER — HOSPITAL ENCOUNTER (OUTPATIENT)
Dept: RADIOLOGY | Facility: HOSPITAL | Age: 80
Discharge: HOME | End: 2025-02-14
Payer: MEDICARE

## 2025-02-14 DIAGNOSIS — M25.362 INSTABILITY OF LEFT KNEE JOINT: ICD-10-CM

## 2025-02-14 PROCEDURE — 73721 MRI JNT OF LWR EXTRE W/O DYE: CPT | Mod: LT

## 2025-02-18 ENCOUNTER — TELEPHONE (OUTPATIENT)
Dept: BEHAVIORAL HEALTH | Facility: CLINIC | Age: 80
End: 2025-02-18
Payer: MEDICARE

## 2025-02-18 NOTE — PROGRESS NOTES
PROVIDER: megan  MEDICATION/DOSAGE:     amphetamine-dextroamphetamine (AdderalL) 10 mg tablet     QTY/SUPPLY:  PRIOR AUTH COMPLETED VIA:epic  INSURANCE:   Selected coverage:JAMES2025 [employer] Enhanced PDP Retail Super ID ([employer] S[employer]TS)Total coverages:1 Demographics on file   JAMES 2025 [employer] Enhanced PDP Retail Super ID ([employer] S[employer]TS)  Covered: Retail, Mail OrderUnknown: Specialty, Long-Term Care                  Member ID: [id number] BIN: [id number]  : 1945   Group ID: [id number] PCN: [id number]  Legal sex: F   Group name: ALEX  PDP [id number]  Address: [address] [place] [address]       [place] [id number]   Use As Primary Coverage        REF #/KEY:  STATUS pending    Note from payer: Denied-Partial. The amount you asked for is over the limit allowed by your plan. This request is for an amount of 90 tablets. This drug can only be filled for an amount of 60 tablets for 30 days. This drug also comes in 12.5 mg, 15 mg, 20 mg, and 30 mg tablets. You can use this strength to stay within the quantity limits. We may be able to make an exception to the quantity limit. Your prescriber will need to send us a statement that explains why you need this amount. The notes can be the number of doses available under the limit does not work to treat your condition or there is sound clinical, medical, or scientific evidence that is likely to not work or cause adverse effect, or cause you to not comply with taking the drug.  Payer: [employer] Patient's Payer Case ID: [id number]    [phone]  Electronic appeal: Not supported  View History    Dr. Burk notified. Patient notified.

## 2025-02-20 DIAGNOSIS — N39.0 RECURRENT UTI: Primary | ICD-10-CM

## 2025-02-20 DIAGNOSIS — G89.29 CHRONIC KNEE PAIN, UNSPECIFIED LATERALITY: ICD-10-CM

## 2025-02-20 DIAGNOSIS — M25.569 CHRONIC KNEE PAIN, UNSPECIFIED LATERALITY: ICD-10-CM

## 2025-03-19 ENCOUNTER — OFFICE VISIT (OUTPATIENT)
Dept: PRIMARY CARE | Facility: CLINIC | Age: 80
End: 2025-03-19
Payer: MEDICARE

## 2025-03-19 VITALS
HEART RATE: 77 BPM | HEIGHT: 64 IN | TEMPERATURE: 97.9 F | OXYGEN SATURATION: 97 % | DIASTOLIC BLOOD PRESSURE: 78 MMHG | WEIGHT: 181 LBS | BODY MASS INDEX: 30.9 KG/M2 | RESPIRATION RATE: 18 BRPM | SYSTOLIC BLOOD PRESSURE: 128 MMHG

## 2025-03-19 DIAGNOSIS — I10 ESSENTIAL HYPERTENSION: Primary | ICD-10-CM

## 2025-03-19 DIAGNOSIS — Z13.0 SCREENING FOR DEFICIENCY ANEMIA: ICD-10-CM

## 2025-03-19 DIAGNOSIS — E03.9 ACQUIRED HYPOTHYROIDISM: ICD-10-CM

## 2025-03-19 DIAGNOSIS — N39.41 URGE INCONTINENCE OF URINE: ICD-10-CM

## 2025-03-19 DIAGNOSIS — F90.8 OTHER SPECIFIED ATTENTION DEFICIT HYPERACTIVITY DISORDER (ADHD): ICD-10-CM

## 2025-03-19 DIAGNOSIS — R73.9 ELEVATED BLOOD SUGAR: ICD-10-CM

## 2025-03-19 DIAGNOSIS — N95.2 ATROPHIC VAGINITIS: ICD-10-CM

## 2025-03-19 DIAGNOSIS — E55.9 VITAMIN D DEFICIENCY: ICD-10-CM

## 2025-03-19 DIAGNOSIS — Z11.59 ENCOUNTER FOR HEPATITIS C SCREENING TEST FOR LOW RISK PATIENT: ICD-10-CM

## 2025-03-19 DIAGNOSIS — D68.2: ICD-10-CM

## 2025-03-19 DIAGNOSIS — E78.2 MIXED HYPERLIPIDEMIA: ICD-10-CM

## 2025-03-19 PROCEDURE — 1159F MED LIST DOCD IN RCRD: CPT | Performed by: NURSE PRACTITIONER

## 2025-03-19 PROCEDURE — 99214 OFFICE O/P EST MOD 30 MIN: CPT | Performed by: NURSE PRACTITIONER

## 2025-03-19 PROCEDURE — 3078F DIAST BP <80 MM HG: CPT | Performed by: NURSE PRACTITIONER

## 2025-03-19 PROCEDURE — 1123F ACP DISCUSS/DSCN MKR DOCD: CPT | Performed by: NURSE PRACTITIONER

## 2025-03-19 PROCEDURE — 1158F ADVNC CARE PLAN TLK DOCD: CPT | Performed by: NURSE PRACTITIONER

## 2025-03-19 PROCEDURE — 99204 OFFICE O/P NEW MOD 45 MIN: CPT | Performed by: NURSE PRACTITIONER

## 2025-03-19 PROCEDURE — 1036F TOBACCO NON-USER: CPT | Performed by: NURSE PRACTITIONER

## 2025-03-19 PROCEDURE — 1126F AMNT PAIN NOTED NONE PRSNT: CPT | Performed by: NURSE PRACTITIONER

## 2025-03-19 PROCEDURE — 3074F SYST BP LT 130 MM HG: CPT | Performed by: NURSE PRACTITIONER

## 2025-03-19 ASSESSMENT — PAIN SCALES - GENERAL: PAINLEVEL_OUTOF10: 0-NO PAIN

## 2025-03-19 NOTE — PROGRESS NOTES
"Subjective   Patient ID: Diane Rao is a 79 y.o. female who presents for Establish Care (NEW PT HERE TO ESTABLISH CARE. PT DOES HOME PT/INR AND [facility] WILL FAX US RESULTS. PT STATES TESTING IS USUALLY ONCE A MONTH.).    PT HERE TO GET ESTABLISHED, SAW DR PICHARDO.SHE JUST LEFT, SEE PSYCH AT .  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      Objective   /70   Pulse 77   Temp 36.6 °C (97.9 °F)   Resp 18   Ht 1.613 m (5' 3.5\")   Wt 82.1 kg (181 lb)   SpO2 97%   BMI 31.56 kg/m²     Physical Exam  Constitutional:       General: She is not in acute distress.     Appearance: Normal appearance.   Cardiovascular:      Rate and Rhythm: Normal rate and regular rhythm.      Heart sounds: No murmur heard.  Pulmonary:      Breath sounds: Normal breath sounds. No wheezing.   Neurological:      Mental Status: She is alert.     Rechecke d bp ok .    Assessment/Plan        WELCOME TO THE PRACTICE, PRACTICE EXPLAINED, WILL RETURN  IN JULY FOR A WELL CHECK, NO MEDS NEEDED. AT THIS VISIT, WILL CALL WITH LAB RESULTS does inr at home, has done this for years . controlled  "

## 2025-03-20 ENCOUNTER — TELEPHONE (OUTPATIENT)
Dept: PRIMARY CARE | Facility: CLINIC | Age: 80
End: 2025-03-20
Payer: MEDICARE

## 2025-03-20 LAB
25(OH)D3+25(OH)D2 SERPL-MCNC: 88 NG/ML (ref 30–100)
ALBUMIN SERPL-MCNC: 4.3 G/DL (ref 3.6–5.1)
ALP SERPL-CCNC: 106 U/L (ref 37–153)
ALT SERPL-CCNC: 17 U/L (ref 6–29)
ANION GAP SERPL CALCULATED.4IONS-SCNC: 10 MMOL/L (CALC) (ref 7–17)
AST SERPL-CCNC: 22 U/L (ref 10–35)
BASOPHILS # BLD AUTO: 33 CELLS/UL (ref 0–200)
BASOPHILS NFR BLD AUTO: 0.4 %
BILIRUB SERPL-MCNC: 0.7 MG/DL (ref 0.2–1.2)
BUN SERPL-MCNC: 21 MG/DL (ref 7–25)
CALCIUM SERPL-MCNC: 9.3 MG/DL (ref 8.6–10.4)
CHLORIDE SERPL-SCNC: 102 MMOL/L (ref 98–110)
CHOLEST SERPL-MCNC: 171 MG/DL
CHOLEST/HDLC SERPL: 3 (CALC)
CO2 SERPL-SCNC: 26 MMOL/L (ref 20–32)
CREAT SERPL-MCNC: 0.9 MG/DL (ref 0.6–1)
EGFRCR SERPLBLD CKD-EPI 2021: 66 ML/MIN/1.73M2
EOSINOPHIL # BLD AUTO: 139 CELLS/UL (ref 15–500)
EOSINOPHIL NFR BLD AUTO: 1.7 %
ERYTHROCYTE [DISTWIDTH] IN BLOOD BY AUTOMATED COUNT: 12.8 % (ref 11–15)
EST. AVERAGE GLUCOSE BLD GHB EST-MCNC: 128 MG/DL
EST. AVERAGE GLUCOSE BLD GHB EST-SCNC: 7.1 MMOL/L
GLUCOSE SERPL-MCNC: 96 MG/DL (ref 65–99)
HBA1C MFR BLD: 6.1 % OF TOTAL HGB
HCT VFR BLD AUTO: 43.8 % (ref 35–45)
HCV AB SERPL QL IA: NORMAL
HDLC SERPL-MCNC: 57 MG/DL
HGB BLD-MCNC: 14 G/DL (ref 11.7–15.5)
LDLC SERPL CALC-MCNC: 89 MG/DL (CALC)
LYMPHOCYTES # BLD AUTO: 1714 CELLS/UL (ref 850–3900)
LYMPHOCYTES NFR BLD AUTO: 20.9 %
MCH RBC QN AUTO: 28.9 PG (ref 27–33)
MCHC RBC AUTO-ENTMCNC: 32 G/DL (ref 32–36)
MCV RBC AUTO: 90.5 FL (ref 80–100)
MONOCYTES # BLD AUTO: 1050 CELLS/UL (ref 200–950)
MONOCYTES NFR BLD AUTO: 12.8 %
NEUTROPHILS # BLD AUTO: 5264 CELLS/UL (ref 1500–7800)
NEUTROPHILS NFR BLD AUTO: 64.2 %
NONHDLC SERPL-MCNC: 114 MG/DL (CALC)
PLATELET # BLD AUTO: 326 THOUSAND/UL (ref 140–400)
PMV BLD REES-ECKER: 11.3 FL (ref 7.5–12.5)
POTASSIUM SERPL-SCNC: 4.2 MMOL/L (ref 3.5–5.3)
PROT SERPL-MCNC: 6.6 G/DL (ref 6.1–8.1)
RBC # BLD AUTO: 4.84 MILLION/UL (ref 3.8–5.1)
SODIUM SERPL-SCNC: 138 MMOL/L (ref 135–146)
TRIGL SERPL-MCNC: 145 MG/DL
TSH SERPL-ACNC: 3.6 MIU/L (ref 0.4–4.5)
WBC # BLD AUTO: 8.2 THOUSAND/UL (ref 3.8–10.8)

## 2025-03-21 DIAGNOSIS — F98.8 ATTENTION DEFICIT DISORDER WITHOUT HYPERACTIVITY: ICD-10-CM

## 2025-03-21 DIAGNOSIS — F90.9 ATTENTION DEFICIT DISORDER WITH HYPERACTIVITY: ICD-10-CM

## 2025-03-21 RX ORDER — DEXTROAMPHETAMINE SACCHARATE, AMPHETAMINE ASPARTATE, DEXTROAMPHETAMINE SULFATE AND AMPHETAMINE SULFATE 2.5; 2.5; 2.5; 2.5 MG/1; MG/1; MG/1; MG/1
10 TABLET ORAL 3 TIMES DAILY
Qty: 270 TABLET | Refills: 0 | Status: CANCELLED | OUTPATIENT
Start: 2025-03-21 | End: 2025-06-19

## 2025-03-21 RX ORDER — DEXTROAMPHETAMINE SACCHARATE, AMPHETAMINE ASPARTATE, DEXTROAMPHETAMINE SULFATE AND AMPHETAMINE SULFATE 3.75; 3.75; 3.75; 3.75 MG/1; MG/1; MG/1; MG/1
15 TABLET ORAL
Qty: 180 TABLET | Refills: 0 | Status: SHIPPED | OUTPATIENT
Start: 2025-03-21 | End: 2025-06-19

## 2025-03-24 ENCOUNTER — ANTICOAGULATION - WARFARIN VISIT (OUTPATIENT)
Dept: PRIMARY CARE | Facility: CLINIC | Age: 80
End: 2025-03-24
Payer: MEDICARE

## 2025-03-24 DIAGNOSIS — I82.519 CHRONIC DEEP VEIN THROMBOSIS (DVT) OF FEMORAL VEIN, UNSPECIFIED LATERALITY (MULTI): ICD-10-CM

## 2025-03-24 DIAGNOSIS — I82.4Y9 DEEP VEIN THROMBOSIS (DVT) OF PROXIMAL LOWER EXTREMITY, UNSPECIFIED CHRONICITY, UNSPECIFIED LATERALITY: ICD-10-CM

## 2025-03-24 DIAGNOSIS — Z79.01 WARFARIN ANTICOAGULATION: ICD-10-CM

## 2025-03-24 LAB
POC INR: 2.1 (ref 2–3)
POC PROTHROMBIN TIME: NORMAL

## 2025-03-24 NOTE — TELEPHONE ENCOUNTER
L/M TO NOTIFY PT PK NOT CONCERNED ABOUT MONOCYTES. ADVISED TO SCHEDULE APPT SOONER THAN CURRENT APPT IN JULY TO DISCUSS LABS

## 2025-04-02 ENCOUNTER — APPOINTMENT (OUTPATIENT)
Dept: BEHAVIORAL HEALTH | Facility: CLINIC | Age: 80
End: 2025-04-02
Payer: MEDICARE

## 2025-04-02 DIAGNOSIS — F41.1 GAD (GENERALIZED ANXIETY DISORDER): ICD-10-CM

## 2025-04-02 DIAGNOSIS — F33.41 RECURRENT MAJOR DEPRESSIVE DISORDER, IN PARTIAL REMISSION (CMS-HCC): ICD-10-CM

## 2025-04-02 DIAGNOSIS — F98.8 ATTENTION DEFICIT DISORDER WITHOUT HYPERACTIVITY: ICD-10-CM

## 2025-04-02 PROCEDURE — 99214 OFFICE O/P EST MOD 30 MIN: CPT | Performed by: PSYCHIATRY & NEUROLOGY

## 2025-04-02 PROCEDURE — G2211 COMPLEX E/M VISIT ADD ON: HCPCS | Performed by: PSYCHIATRY & NEUROLOGY

## 2025-04-02 PROCEDURE — 1123F ACP DISCUSS/DSCN MKR DOCD: CPT | Performed by: PSYCHIATRY & NEUROLOGY

## 2025-04-02 RX ORDER — DEXTROAMPHETAMINE SACCHARATE, AMPHETAMINE ASPARTATE, DEXTROAMPHETAMINE SULFATE AND AMPHETAMINE SULFATE 3.75; 3.75; 3.75; 3.75 MG/1; MG/1; MG/1; MG/1
15 TABLET ORAL
Qty: 60 TABLET | Refills: 0 | Status: SHIPPED | OUTPATIENT
Start: 2025-05-15 | End: 2025-06-14

## 2025-04-02 RX ORDER — DEXTROAMPHETAMINE SACCHARATE, AMPHETAMINE ASPARTATE, DEXTROAMPHETAMINE SULFATE AND AMPHETAMINE SULFATE 3.75; 3.75; 3.75; 3.75 MG/1; MG/1; MG/1; MG/1
15 TABLET ORAL
Qty: 60 TABLET | Refills: 0 | Status: SHIPPED | OUTPATIENT
Start: 2025-04-15 | End: 2025-05-15

## 2025-04-02 RX ORDER — DEXTROAMPHETAMINE SACCHARATE, AMPHETAMINE ASPARTATE, DEXTROAMPHETAMINE SULFATE AND AMPHETAMINE SULFATE 3.75; 3.75; 3.75; 3.75 MG/1; MG/1; MG/1; MG/1
15 TABLET ORAL
Qty: 60 TABLET | Refills: 0 | Status: SHIPPED | OUTPATIENT
Start: 2025-06-13 | End: 2025-07-13

## 2025-04-02 NOTE — PROGRESS NOTES
Outpatient Psychiatry - Med Management Followup      Location of service:  ___ Office         ___ A/V            __x_Telephone (3 factor ID completed)           Subjective   Diane Rao, a 79 y.o. female, for followup visit.           Assessment/Plan   Problem List Items Addressed This Visit               ICD-10-CM     Major depression, recurrent (CMS/HCC) F33.9     Attention deficit disorder without hyperactivity F98.8     GLEN (generalized anxiety disorder) F41.1         Treatment Plan/Recommendations:   Continue current medications and support  Followup in 3 months  Needs Adderall renewed; has Prozac through July and  Has only used 2 Lorazepam in past 3 months.        Referral to:  [facility]     Review with patient:   Follow-up plan was discussed with patient.  Treatment plan reviewed with the patient.  Medication risks/benefit reviewed with the patient     HPI:    Doing well:  Continues sobriety, happy in marriage.  Major issue was inability to get her Adderal 10 mg 3X daily through mail order; required prior authorization but never completed.. tried taking only 2 a day but found her concentration and focus declined significantly and she had symptoms of depression recurring.  Ultimately agreed to try 15 mg 2X daily and filled at local pharmacy, which only charged $5 so she'd prefer that going forward; [employer] appears to let me pre-authorize presciptions so will do three one-month prescriptions to be filled April 15, May 15, June 13 to be sufficient to next visit.  Otherwise doing well; no suicidal or homicidal ideation, mood has been stable, loving her family life, very happy.  Only used 2 Lorazepam since last discussed, both during period she was on only 20 mg Adderall.    Current Medications:    FLUoxetine (PROzac) 20 mg capsule, Take 1 capsule (20 mg) by mouth 2 times a day  - has refill    amphetamine-dextroamphetamine (Adderall) 15 mg tablet, Take 1 tablet (10 mg) by mouth 2 times a day - needs refills - 3  "prescriptions written with different \"do not fill\" dates.    LORazepam (Ativan) 0.5 mg tablet, Take 1 tablet (0.5 mg) by mouth every 8 hours  Only uses on rare occasions.     Interval Medical History:  Not significant - no medication changes, no hospitalizations or major evenets     Psychiatric Review Of Systems:   Depressive Symptoms: not significant at this time  Manic Symptoms: NA  Anxiety Symptoms: reduced with medication at this time  Disordered Eating Symptoms: NA  Inattentive Symptoms: improved with medication  Hyperactive/Impulsive Symptoms: NA  Trauma Symptoms: NA  Conduct Issues: NA  Psychotic Symptoms: NA  Developmental Concerns: NA  Other Symptoms/Concerns:NA  Delirium/Altered Mental Status Symptoms: NA              Objective   Mental Status Exam:  Patient is a well nourished, well developed white female  appearing to be approximately stated age  Appearance:   Well groomed, appropriately dressed   Attention:  Oriented X 3  Speech:         Form:  fluent, coherent, relevant, organized; evidence for formal thought disorder       Process:  abstract; without abnormal associations       Content:  without hallucinations, delusions,  denies suicidal or homicidal ideation  Movements: No apparent gait disturbance or abnormal involuntary movements of face or trunk  Mood:  \"Good\"  Affect:  Euthymic  Cognition:   Grossly intact and appropriate to level of educational attainment  Judgement:   Good  Insight:   Good         Vitals:  There were no vitals filed for this visit.        Total time spent 30\" with patient; at least 50% of time spent in counseling, explanation of diagnosis, planning of further management, and coordination of care     Jeff Burk MD MPH  "

## 2025-04-30 LAB
INR IN PPP BY COAGULATION ASSAY EXTERNAL: 2.6 (ref 2–3)
PROTHROMBIN TIME (PT) IN PPP BY COAGULATION ASSAY EXTERNAL: NORMAL

## 2025-05-01 ENCOUNTER — ANTICOAGULATION - WARFARIN VISIT (OUTPATIENT)
Dept: PRIMARY CARE | Facility: CLINIC | Age: 80
End: 2025-05-01
Payer: MEDICARE

## 2025-05-05 ENCOUNTER — APPOINTMENT (OUTPATIENT)
Dept: PHYSICAL THERAPY | Facility: CLINIC | Age: 80
End: 2025-05-05
Payer: MEDICARE

## 2025-05-06 DIAGNOSIS — E03.9 ACQUIRED HYPOTHYROIDISM: ICD-10-CM

## 2025-05-06 DIAGNOSIS — E78.00 PURE HYPERCHOLESTEROLEMIA: ICD-10-CM

## 2025-05-06 DIAGNOSIS — I82.519 CHRONIC DEEP VEIN THROMBOSIS (DVT) OF FEMORAL VEIN, UNSPECIFIED LATERALITY (MULTI): ICD-10-CM

## 2025-05-06 DIAGNOSIS — I10 ESSENTIAL HYPERTENSION: ICD-10-CM

## 2025-05-06 RX ORDER — LEVOTHYROXINE SODIUM 50 UG/1
50 TABLET ORAL DAILY
Qty: 90 TABLET | Refills: 1 | Status: SHIPPED | OUTPATIENT
Start: 2025-05-06

## 2025-05-06 RX ORDER — ATORVASTATIN CALCIUM 40 MG/1
40 TABLET, FILM COATED ORAL DAILY
Qty: 90 TABLET | Refills: 1 | Status: SHIPPED | OUTPATIENT
Start: 2025-05-06

## 2025-05-08 ENCOUNTER — APPOINTMENT (OUTPATIENT)
Dept: OBSTETRICS AND GYNECOLOGY | Facility: CLINIC | Age: 80
End: 2025-05-08
Payer: MEDICARE

## 2025-05-13 ENCOUNTER — APPOINTMENT (OUTPATIENT)
Dept: PHYSICAL THERAPY | Facility: CLINIC | Age: 80
End: 2025-05-13
Payer: MEDICARE

## 2025-05-20 ENCOUNTER — APPOINTMENT (OUTPATIENT)
Dept: PHYSICAL THERAPY | Facility: CLINIC | Age: 80
End: 2025-05-20
Payer: MEDICARE

## 2025-05-26 LAB
INR IN PPP BY COAGULATION ASSAY EXTERNAL: 2.1
PROTHROMBIN TIME (PT) IN PPP BY COAGULATION ASSAY EXTERNAL: NORMAL

## 2025-05-27 ENCOUNTER — TELEPHONE (OUTPATIENT)
Dept: PRIMARY CARE | Facility: CLINIC | Age: 80
End: 2025-05-27
Payer: MEDICARE

## 2025-06-05 ENCOUNTER — APPOINTMENT (OUTPATIENT)
Dept: PRIMARY CARE | Facility: CLINIC | Age: 80
End: 2025-06-05
Payer: MEDICARE

## 2025-06-18 ENCOUNTER — APPOINTMENT (OUTPATIENT)
Dept: BEHAVIORAL HEALTH | Facility: CLINIC | Age: 80
End: 2025-06-18
Payer: MEDICARE

## 2025-06-25 ENCOUNTER — APPOINTMENT (OUTPATIENT)
Dept: BEHAVIORAL HEALTH | Facility: CLINIC | Age: 80
End: 2025-06-25
Payer: MEDICARE

## 2025-06-30 ENCOUNTER — ANTICOAGULATION - WARFARIN VISIT (OUTPATIENT)
Dept: PRIMARY CARE | Facility: CLINIC | Age: 80
End: 2025-06-30
Payer: MEDICARE

## 2025-06-30 DIAGNOSIS — I82.4Y9 DEEP VEIN THROMBOSIS (DVT) OF PROXIMAL LOWER EXTREMITY, UNSPECIFIED CHRONICITY, UNSPECIFIED LATERALITY: ICD-10-CM

## 2025-06-30 DIAGNOSIS — Z79.01 WARFARIN ANTICOAGULATION: ICD-10-CM

## 2025-07-01 LAB
POC INR: 1.9 (ref 2–3)
POC PROTHROMBIN TIME: ABNORMAL (ref 0–?)

## 2025-07-07 DIAGNOSIS — I10 ESSENTIAL HYPERTENSION: ICD-10-CM

## 2025-07-07 DIAGNOSIS — E78.00 PURE HYPERCHOLESTEROLEMIA: ICD-10-CM

## 2025-07-07 DIAGNOSIS — I82.519 CHRONIC DEEP VEIN THROMBOSIS (DVT) OF FEMORAL VEIN, UNSPECIFIED LATERALITY (MULTI): ICD-10-CM

## 2025-07-07 RX ORDER — WARFARIN 2.5 MG/1
TABLET ORAL
Qty: 78 TABLET | Refills: 1 | Status: SHIPPED | OUTPATIENT
Start: 2025-07-07

## 2025-07-07 RX ORDER — METOPROLOL TARTRATE 25 MG/1
25 TABLET, FILM COATED ORAL 2 TIMES DAILY
Qty: 180 TABLET | Refills: 1 | Status: SHIPPED | OUTPATIENT
Start: 2025-07-07

## 2025-07-14 ENCOUNTER — OFFICE VISIT (OUTPATIENT)
Dept: PRIMARY CARE | Facility: CLINIC | Age: 80
End: 2025-07-14
Payer: MEDICARE

## 2025-07-14 VITALS
DIASTOLIC BLOOD PRESSURE: 70 MMHG | OXYGEN SATURATION: 98 % | HEIGHT: 64 IN | HEART RATE: 67 BPM | BODY MASS INDEX: 30.49 KG/M2 | TEMPERATURE: 97.8 F | SYSTOLIC BLOOD PRESSURE: 132 MMHG | RESPIRATION RATE: 18 BRPM | WEIGHT: 178.6 LBS

## 2025-07-14 DIAGNOSIS — N89.8 VAGINAL DRYNESS: ICD-10-CM

## 2025-07-14 DIAGNOSIS — N95.2 POSTMENOPAUSAL ATROPHIC VAGINITIS: ICD-10-CM

## 2025-07-14 DIAGNOSIS — E03.9 ACQUIRED HYPOTHYROIDISM: ICD-10-CM

## 2025-07-14 DIAGNOSIS — R35.0 URINARY FREQUENCY: Primary | ICD-10-CM

## 2025-07-14 LAB
POC APPEARANCE, URINE: CLEAR
POC BILIRUBIN, URINE: NEGATIVE
POC BLOOD, URINE: ABNORMAL
POC COLOR, URINE: YELLOW
POC GLUCOSE, URINE: NEGATIVE MG/DL
POC KETONES, URINE: ABNORMAL MG/DL
POC LEUKOCYTES, URINE: ABNORMAL
POC NITRITE,URINE: NEGATIVE
POC PH, URINE: 5.5 PH
POC PROTEIN, URINE: ABNORMAL MG/DL
POC SPECIFIC GRAVITY, URINE: 1.02
POC UROBILINOGEN, URINE: 0.2 EU/DL

## 2025-07-14 PROCEDURE — 99214 OFFICE O/P EST MOD 30 MIN: CPT | Performed by: NURSE PRACTITIONER

## 2025-07-14 PROCEDURE — 81003 URINALYSIS AUTO W/O SCOPE: CPT | Mod: QW | Performed by: NURSE PRACTITIONER

## 2025-07-14 PROCEDURE — 1159F MED LIST DOCD IN RCRD: CPT | Performed by: NURSE PRACTITIONER

## 2025-07-14 PROCEDURE — 3078F DIAST BP <80 MM HG: CPT | Performed by: NURSE PRACTITIONER

## 2025-07-14 PROCEDURE — 1126F AMNT PAIN NOTED NONE PRSNT: CPT | Performed by: NURSE PRACTITIONER

## 2025-07-14 PROCEDURE — 3075F SYST BP GE 130 - 139MM HG: CPT | Performed by: NURSE PRACTITIONER

## 2025-07-14 RX ORDER — NITROFURANTOIN 25; 75 MG/1; MG/1
100 CAPSULE ORAL 2 TIMES DAILY
Qty: 14 CAPSULE | Refills: 0 | Status: SHIPPED | OUTPATIENT
Start: 2025-07-14 | End: 2025-07-21

## 2025-07-14 RX ORDER — ESTRADIOL 0.1 MG/G
0.1 CREAM VAGINAL 2 TIMES WEEKLY
Qty: 42.5 G | Refills: 1 | Status: SHIPPED | OUTPATIENT
Start: 2025-07-14

## 2025-07-14 RX ORDER — PHENAZOPYRIDINE HYDROCHLORIDE 200 MG/1
200 TABLET, FILM COATED ORAL 3 TIMES DAILY PRN
Qty: 6 TABLET | Refills: 0 | Status: SHIPPED | OUTPATIENT
Start: 2025-07-14 | End: 2025-07-16

## 2025-07-14 ASSESSMENT — PAIN SCALES - GENERAL: PAINLEVEL_OUTOF10: 0-NO PAIN

## 2025-07-14 ASSESSMENT — PATIENT HEALTH QUESTIONNAIRE - PHQ9
SUM OF ALL RESPONSES TO PHQ9 QUESTIONS 1 AND 2: 0
2. FEELING DOWN, DEPRESSED OR HOPELESS: NOT AT ALL
1. LITTLE INTEREST OR PLEASURE IN DOING THINGS: NOT AT ALL

## 2025-07-14 NOTE — PROGRESS NOTES
Subjective   Patient ID: Diane Rao is a 79 y.o. female who presents for No chief complaint on file..    HERE FOR A CHECK UP.LAST SEEN 6 MONTHS DOING WELL GOING TO PHYSICALal  therapy very helpful . Able to get around without a walker! IS CONFUSED AT TO WHAT TO EXPECT FROM this  visit , , SE'S PSYCH FOR ADHD AND ANXIETY, has some urinary frequency    Urinary Frequency   This is a new problem. The current episode started in the past 7 days. The problem occurs intermittently. The problem has been waxing and waning. The quality of the pain is described as aching. The pain is mild. There has been no fever. The fever has been present for 3 - 4 days. She is Sexually active. There is No history of pyelonephritis. Associated symptoms include frequency and urgency. The treatment provided no relief.        Review of Systems   Genitourinary:  Positive for frequency and urgency.       Objective   There were no vitals taken for this visit.    Physical Exam  Constitutional:       Appearance: Normal appearance.   Cardiovascular:      Rate and Rhythm: Normal rate and regular rhythm.      Pulses: Normal pulses.      Heart sounds: Normal heart sounds.   Pulmonary:      Effort: Pulmonary effort is normal.   Abdominal:      General: Bowel sounds are normal.   Neurological:      Mental Status: She is alert and oriented to person, place, and time.   Psychiatric:         Behavior: Behavior normal.         Assessment/Plan   Problem List Items Addressed This Visit           ICD-10-CM    Acquired hypothyroidism E03.9     Other Visit Diagnoses         Codes      Urinary frequency    -  Primary R35.0    Relevant Medications    nitrofurantoin, macrocrystal-monohydrate, (Macrobid) 100 mg capsule    phenazopyridine (Pyridium) 200 mg tablet    Other Relevant Orders    POCT UA Automated manually resulted (Completed)    Urine Culture    Urine Culture      Postmenopausal atrophic vaginitis     N95.2    Relevant Medications    estradiol  (Estrace) 0.01 % (0.1 mg/gram) vaginal cream      Vaginal dryness     N89.8        Will call with results of urine culture return I fall kanika a well visit

## 2025-07-15 ASSESSMENT — ENCOUNTER SYMPTOMS: FREQUENCY: 1

## 2025-07-16 LAB — BACTERIA UR CULT: NORMAL

## 2025-07-23 ENCOUNTER — APPOINTMENT (OUTPATIENT)
Dept: BEHAVIORAL HEALTH | Facility: CLINIC | Age: 80
End: 2025-07-23
Payer: MEDICARE

## 2025-07-23 DIAGNOSIS — F98.8 ATTENTION DEFICIT DISORDER WITHOUT HYPERACTIVITY: ICD-10-CM

## 2025-07-23 DIAGNOSIS — F41.1 GAD (GENERALIZED ANXIETY DISORDER): ICD-10-CM

## 2025-07-23 DIAGNOSIS — F33.41 RECURRENT MAJOR DEPRESSIVE DISORDER, IN PARTIAL REMISSION: ICD-10-CM

## 2025-07-23 PROCEDURE — 99214 OFFICE O/P EST MOD 30 MIN: CPT | Performed by: PSYCHIATRY & NEUROLOGY

## 2025-07-23 PROCEDURE — G2211 COMPLEX E/M VISIT ADD ON: HCPCS | Performed by: PSYCHIATRY & NEUROLOGY

## 2025-07-23 RX ORDER — DEXTROAMPHETAMINE SACCHARATE, AMPHETAMINE ASPARTATE, DEXTROAMPHETAMINE SULFATE AND AMPHETAMINE SULFATE 3.75; 3.75; 3.75; 3.75 MG/1; MG/1; MG/1; MG/1
15 TABLET ORAL
Qty: 60 TABLET | Refills: 0 | Status: SHIPPED | OUTPATIENT
Start: 2005-08-18 | End: 2025-07-23 | Stop reason: SDUPTHER

## 2025-07-23 RX ORDER — DEXTROAMPHETAMINE SACCHARATE, AMPHETAMINE ASPARTATE, DEXTROAMPHETAMINE SULFATE AND AMPHETAMINE SULFATE 3.75; 3.75; 3.75; 3.75 MG/1; MG/1; MG/1; MG/1
15 TABLET ORAL
Qty: 60 TABLET | Refills: 0 | Status: SHIPPED | OUTPATIENT
Start: 2025-08-18 | End: 2025-09-17

## 2025-07-23 RX ORDER — DEXTROAMPHETAMINE SACCHARATE, AMPHETAMINE ASPARTATE, DEXTROAMPHETAMINE SULFATE AND AMPHETAMINE SULFATE 3.75; 3.75; 3.75; 3.75 MG/1; MG/1; MG/1; MG/1
15 TABLET ORAL DAILY
Qty: 30 TABLET | Refills: 0 | Status: SHIPPED | OUTPATIENT
Start: 2025-10-22 | End: 2025-11-21

## 2025-07-23 RX ORDER — DEXTROAMPHETAMINE SACCHARATE, AMPHETAMINE ASPARTATE, DEXTROAMPHETAMINE SULFATE AND AMPHETAMINE SULFATE 3.75; 3.75; 3.75; 3.75 MG/1; MG/1; MG/1; MG/1
15 TABLET ORAL
Qty: 60 TABLET | Refills: 0 | Status: SHIPPED | OUTPATIENT
Start: 2025-09-18 | End: 2025-10-18

## 2025-07-23 NOTE — PROGRESS NOTES
"Outpatient Psychiatry - Med Management Followup      Location of service:  ___ Office         ___ A/V            __x_Telephone (3 factor ID completed)           Subjective   Diane Rao, a 79 y.o. female, for followup visit.           Assessment/Plan   Problem List Items Addressed This Visit               ICD-10-CM     Major depression, recurrent (CMS/HCC) F33.9     Attention deficit disorder without hyperactivity F98.8     GLEN (generalized anxiety disorder) F41.1         Treatment Plan/Recommendations:   Continue current medications and support  Followup in 3 months  Needs Adderall renewed; has Prozac through July and  Has only used 2 Lorazepam in past 3 months.        Referral to:  NA     Review with patient:   Follow-up plan was discussed with patient.  Treatment plan reviewed with the patient.  Medication risks/benefit reviewed with the patient     HPI:    Doing well:  Continues sobriety, happy in marriage.  Getting more exercise, lost about 5 pounds so far.  General health is fine. [employer] appears to let me pre-authorize presciptions so will do three one-month prescriptions to be filled August, September, October to be sufficient to next visit.  Otherwise doing well; no suicidal or homicidal ideation, mood has been stable, loving her family life, very happy.       Current Medications:    FLUoxetine (PROzac) 20 mg capsule, Take 1 capsule (20 mg) by mouth 2 times a day  - has refill    amphetamine-dextroamphetamine (Adderall) 15 mg tablet, Take 1 tablet (15 mg) by mouth 2 times a day - needs refills - 3 prescriptions written with different \"do not fill\" dates.    LORazepam (Ativan) 0.5 mg tablet, Take 1 tablet (0.5 mg) by mouth every 8 hours  Only uses on rare occasions.     Interval Medical History:  Not significant - no medication changes, no hospitalizations or major evenets     Psychiatric Review Of Systems:   Depressive Symptoms: not significant at this time  Manic Symptoms: NA  Anxiety Symptoms: reduced " "with medication at this time  Disordered Eating Symptoms: NA  Inattentive Symptoms: improved with medication  Hyperactive/Impulsive Symptoms: NA  Trauma Symptoms: NA  Conduct Issues: NA  Psychotic Symptoms: NA  Developmental Concerns: NA  Other Symptoms/Concerns:NA  Delirium/Altered Mental Status Symptoms: NA              Objective   Mental Status Exam:  Patient is a well nourished, well developed white female  appearing to be approximately stated age  Appearance:   Well groomed, appropriately dressed   Attention:  Oriented X 3  Speech:         Form:  fluent, coherent, relevant, organized; evidence for formal thought disorder       Process:  abstract; without abnormal associations       Content:  without hallucinations, delusions,  denies suicidal or homicidal ideation  Movements: No apparent gait disturbance or abnormal involuntary movements of face or trunk  Mood:  \"Good\"  Affect:  Euthymic  Cognition:   Grossly intact and appropriate to level of educational attainment  Judgement:   Good  Insight:   Good         Vitals:  There were no vitals filed for this visit.        Total time spent 30\" with patient; at least 50% of time spent in counseling, explanation of diagnosis, planning of further management, and coordination of care     Jeff Burk MD MPH     "

## 2025-08-08 DIAGNOSIS — R35.0 URINARY FREQUENCY: ICD-10-CM

## 2025-08-12 DIAGNOSIS — E78.00 PURE HYPERCHOLESTEROLEMIA: ICD-10-CM

## 2025-08-12 DIAGNOSIS — I10 ESSENTIAL HYPERTENSION: ICD-10-CM

## 2025-08-12 DIAGNOSIS — I82.519 CHRONIC DEEP VEIN THROMBOSIS (DVT) OF FEMORAL VEIN, UNSPECIFIED LATERALITY (MULTI): ICD-10-CM

## 2025-08-13 LAB
POC INR: 2.1 (ref 2–3)
POC PROTHROMBIN TIME: ABNORMAL (ref 9.3–12.5)

## 2025-08-13 RX ORDER — AMLODIPINE BESYLATE 5 MG/1
5 TABLET ORAL DAILY
Qty: 90 TABLET | Refills: 1 | Status: SHIPPED | OUTPATIENT
Start: 2025-08-13

## 2025-08-18 ENCOUNTER — ANTICOAGULATION - WARFARIN VISIT (OUTPATIENT)
Dept: PRIMARY CARE | Facility: CLINIC | Age: 80
End: 2025-08-18
Payer: MEDICARE

## 2025-08-18 DIAGNOSIS — I82.519 CHRONIC DEEP VEIN THROMBOSIS (DVT) OF FEMORAL VEIN, UNSPECIFIED LATERALITY (MULTI): ICD-10-CM

## 2025-08-18 DIAGNOSIS — I82.4Y9 DEEP VEIN THROMBOSIS (DVT) OF PROXIMAL LOWER EXTREMITY, UNSPECIFIED CHRONICITY, UNSPECIFIED LATERALITY: ICD-10-CM

## 2025-08-18 DIAGNOSIS — Z79.01 WARFARIN ANTICOAGULATION: ICD-10-CM

## 2025-10-22 ENCOUNTER — APPOINTMENT (OUTPATIENT)
Dept: BEHAVIORAL HEALTH | Facility: CLINIC | Age: 80
End: 2025-10-22
Payer: MEDICARE

## 2026-01-26 ENCOUNTER — APPOINTMENT (OUTPATIENT)
Dept: PRIMARY CARE | Facility: CLINIC | Age: 81
End: 2026-01-26
Payer: MEDICARE